# Patient Record
Sex: MALE | Race: WHITE | Employment: OTHER | URBAN - METROPOLITAN AREA
[De-identification: names, ages, dates, MRNs, and addresses within clinical notes are randomized per-mention and may not be internally consistent; named-entity substitution may affect disease eponyms.]

---

## 2017-03-17 ENCOUNTER — OFFICE VISIT (OUTPATIENT)
Dept: FAMILY MEDICINE | Facility: CLINIC | Age: 82
End: 2017-03-17
Payer: COMMERCIAL

## 2017-03-17 VITALS
RESPIRATION RATE: 18 BRPM | HEART RATE: 55 BPM | BODY MASS INDEX: 30.42 KG/M2 | TEMPERATURE: 97.9 F | DIASTOLIC BLOOD PRESSURE: 76 MMHG | SYSTOLIC BLOOD PRESSURE: 128 MMHG | WEIGHT: 212 LBS | OXYGEN SATURATION: 94 %

## 2017-03-17 DIAGNOSIS — N18.2 TYPE 2 DIABETES MELLITUS WITH STAGE 2 CHRONIC KIDNEY DISEASE, WITHOUT LONG-TERM CURRENT USE OF INSULIN (H): ICD-10-CM

## 2017-03-17 DIAGNOSIS — I10 HYPERTENSION GOAL BP (BLOOD PRESSURE) < 140/90: ICD-10-CM

## 2017-03-17 DIAGNOSIS — E78.5 HYPERLIPIDEMIA LDL GOAL <70: ICD-10-CM

## 2017-03-17 DIAGNOSIS — I48.20 CHRONIC ATRIAL FIBRILLATION (H): Primary | ICD-10-CM

## 2017-03-17 DIAGNOSIS — E11.22 TYPE 2 DIABETES MELLITUS WITH STAGE 2 CHRONIC KIDNEY DISEASE, WITHOUT LONG-TERM CURRENT USE OF INSULIN (H): ICD-10-CM

## 2017-03-17 DIAGNOSIS — J44.1 COPD EXACERBATION (H): ICD-10-CM

## 2017-03-17 DIAGNOSIS — I10 ESSENTIAL HYPERTENSION WITH GOAL BLOOD PRESSURE LESS THAN 140/90: ICD-10-CM

## 2017-03-17 LAB
ANION GAP SERPL CALCULATED.3IONS-SCNC: 10 MMOL/L (ref 3–14)
BUN SERPL-MCNC: 24 MG/DL (ref 7–30)
CALCIUM SERPL-MCNC: 8.3 MG/DL (ref 8.5–10.1)
CHLORIDE SERPL-SCNC: 102 MMOL/L (ref 94–109)
CHOLEST SERPL-MCNC: 146 MG/DL
CO2 SERPL-SCNC: 24 MMOL/L (ref 20–32)
CREAT SERPL-MCNC: 1.32 MG/DL (ref 0.66–1.25)
GFR SERPL CREATININE-BSD FRML MDRD: 52 ML/MIN/1.7M2
GLUCOSE SERPL-MCNC: 176 MG/DL (ref 70–99)
HBA1C MFR BLD: 7.4 % (ref 4.3–6)
HDLC SERPL-MCNC: 51 MG/DL
LDLC SERPL CALC-MCNC: 75 MG/DL
NONHDLC SERPL-MCNC: 95 MG/DL
POTASSIUM SERPL-SCNC: 4.6 MMOL/L (ref 3.4–5.3)
SODIUM SERPL-SCNC: 136 MMOL/L (ref 133–144)
T4 FREE SERPL-MCNC: 1.1 NG/DL (ref 0.76–1.46)
TRIGL SERPL-MCNC: 99 MG/DL
TSH SERPL DL<=0.005 MIU/L-ACNC: 4.57 MU/L (ref 0.4–4)

## 2017-03-17 PROCEDURE — 80048 BASIC METABOLIC PNL TOTAL CA: CPT | Performed by: PHYSICIAN ASSISTANT

## 2017-03-17 PROCEDURE — 84439 ASSAY OF FREE THYROXINE: CPT | Performed by: PHYSICIAN ASSISTANT

## 2017-03-17 PROCEDURE — 80061 LIPID PANEL: CPT | Performed by: PHYSICIAN ASSISTANT

## 2017-03-17 PROCEDURE — 99214 OFFICE O/P EST MOD 30 MIN: CPT | Performed by: PHYSICIAN ASSISTANT

## 2017-03-17 PROCEDURE — 84443 ASSAY THYROID STIM HORMONE: CPT | Performed by: PHYSICIAN ASSISTANT

## 2017-03-17 PROCEDURE — 83036 HEMOGLOBIN GLYCOSYLATED A1C: CPT | Performed by: PHYSICIAN ASSISTANT

## 2017-03-17 PROCEDURE — 36415 COLL VENOUS BLD VENIPUNCTURE: CPT | Performed by: PHYSICIAN ASSISTANT

## 2017-03-17 RX ORDER — GLIPIZIDE 2.5 MG/1
TABLET, EXTENDED RELEASE ORAL
Qty: 270 TABLET | Refills: 1 | Status: SHIPPED | OUTPATIENT
Start: 2017-03-17 | End: 2017-10-03

## 2017-03-17 RX ORDER — SIMVASTATIN 40 MG
40 TABLET ORAL AT BEDTIME
Qty: 90 TABLET | Refills: 1 | Status: SHIPPED | OUTPATIENT
Start: 2017-03-17 | End: 2017-10-03

## 2017-03-17 RX ORDER — AMLODIPINE BESYLATE 10 MG/1
10 TABLET ORAL DAILY
Qty: 90 TABLET | Refills: 1 | Status: SHIPPED | OUTPATIENT
Start: 2017-03-17 | End: 2017-10-03

## 2017-03-17 RX ORDER — LOSARTAN POTASSIUM 25 MG/1
25 TABLET ORAL DAILY
Qty: 90 TABLET | Refills: 1 | Status: SHIPPED | OUTPATIENT
Start: 2017-03-17 | End: 2017-09-25

## 2017-03-17 NOTE — MR AVS SNAPSHOT
"              After Visit Summary   3/17/2017    Camilo Young    MRN: [id number]           Patient Information     Date Of Birth          3/2/1934        Visit Information        Provider Department      3/17/2017 10:00 AM Aubrey Delgado PA-C [facility]        Today's Diagnoses     Chronic atrial fibrillation (H)    -  1    Essential hypertension with goal blood pressure less than 140/90        Hypertension goal BP (blood pressure) < 140/90        Hyperlipidemia LDL goal <70        Type 2 diabetes mellitus with stage 2 chronic kidney disease, without long-term current use of insulin (H)        COPD exacerbation (H)           Follow-ups after your visit        Who to contact     Normal or non-critical lab and imaging results will be communicated to you by [employer], letter or phone within 4 business days after the clinic has received the results. If you do not hear from us within 7 days, please contact the clinic through [employer] or phone. If you have a critical or abnormal lab result, we will notify you by phone as soon as possible.  Submit refill requests through [employer] or call your pharmacy and they will forward the refill request to us. Please allow 3 business days for your refill to be completed.          If you need to speak with a  for additional information , please call: [phone]             Additional Information About Your Visit        Text[employer]ker Information     [employer] lets you send messages to your doctor, view your test results, renew your prescriptions, schedule appointments and more. To sign up, go to [email or web address] . Click on \"Log in\" on the left side of the screen, which will take you to the Welcome page. Then click on \"Sign up Now\" on the right side of the page.     You will be asked to enter the access code listed below, as well as some personal information. Please follow the directions to create your username and password.     Your access code is: " [email or web address]  Expires: 6/15/2017 11:05 AM     Your access code will  in 90 days. If you need help or a new code, please call your [facility] clinic or [phone].        Care EveryWhere ID     This is your Care EveryWhere ID. This could be used by other organizations to access your [facility] medical records  [id number]        Your Vitals Were     Pulse Temperature Respirations Pulse Oximetry BMI (Body Mass Index)       55 97.9  F (36.6  C) (Tympanic) 18 94% 30.42 kg/m2        Blood Pressure from Last 3 Encounters:   17 128/76   16 122/72   16 115/78    Weight from Last 3 Encounters:   17 212 lb (96.2 kg)   16 219 lb (99.3 kg)   16 213 lb (96.6 kg)              We Performed the Following     Basic metabolic panel  (Ca, Cl, CO2, Creat, Gluc, K, Na, BUN)     Hemoglobin A1c     Lipid panel reflex to direct LDL     TSH with free T4 reflex          Today's Medication Changes          These changes are accurate as of: 3/17/17 11:05 AM.  If you have any questions, ask your nurse or doctor.               These medicines have changed or have updated prescriptions.        Dose/Directions    aspirin 81 MG tablet   This may have changed:  Another medication with the same name was removed. Continue taking this medication, and follow the directions you see here.   Changed by:  Aubrey Delgado PA-C        Dose:  81 mg   Take 81 mg by mouth daily   Refills:  0         Stop taking these medicines if you haven't already. Please contact your care team if you have questions.     albuterol 108 (90 BASE) MCG/ACT Inhaler   Commonly known as:  VENTOLIN HFA   Stopped by:  Aubrey Delgado PA-C           azithromycin 250 MG tablet   Commonly known as:  ZITHROMAX   Stopped by:  Aubrey Delgado PA-C           beclomethasone 80 MCG/ACT Inhaler   Commonly known as:  QVAR   Stopped by:  Aubrey Delgado PA-C           tiotropium 2.5 MCG/ACT inhalation aerosol   Commonly known as:  SPIRIVA  RESPIMAT   Stopped by:  Aubrey Delgado PA-C                Where to get your medicines      These medications were sent to [id number] - [place], [place] - [address]  [address] [place] [address]     Phone:  [phone]     amLODIPine 10 MG tablet    glipiZIDE 2.5 MG 24 hr tablet    losartan 25 MG tablet    metFORMIN 1000 MG tablet    simvastatin 40 MG tablet                Primary Care Provider Office Phone # Fax #    Aubrey Delgado PA-C [phone]       [facility] [place] [address]  [place] [address]        Thank you!     Thank you for choosing [facility]  for your care. Our goal is always to provide you with excellent care. Hearing back from our patients is one way we can continue to improve our services. Please take a few minutes to complete the written survey that you may receive in the mail after your visit with us. Thank you!             Your Updated Medication List - Protect others around you: Learn how to safely use, store and throw away your medicines at www.disposemymeds.org.          This list is accurate as of: 3/17/17 11:05 AM.  Always use your most recent med list.                   Brand Name Dispense Instructions for use    ADVAIR DISKUS IN          amLODIPine 10 MG tablet    NORVASC    90 tablet    Take 1 tablet (10 mg) by mouth daily       apixaban ANTICOAGULANT 2.5 MG tablet    ELIQUIS    180 tablet    Take 1 tablet (2.5 mg) by mouth 2 times daily       aspirin 81 MG tablet      Take 81 mg by mouth daily       blood glucose monitoring lancets     1 Box    1 each 3 times daily       blood glucose monitoring test strip    ACCU-CHEK SMARTVIEW    3 Month    1 strip by In Vitro route 3 times daily Or as recommended by provider       glipiZIDE 2.5 MG 24 hr tablet    GLUCOTROL XL    270 tablet    Take 2 tabs in the morning prior to breakfast and 1 prior to supper       losartan 25 MG tablet    COZAAR    90 tablet    Take 1 tablet  (25 mg) by mouth daily       metFORMIN 1000 MG tablet    GLUCOPHAGE    180 tablet    Take 1 tablet (1,000 mg) by mouth 2 times daily (with meals)       simvastatin 40 MG tablet    ZOCOR    90 tablet    Take 1 tablet (40 mg) by mouth At Bedtime

## 2017-03-17 NOTE — LETTER
[facility]  [address] [place] [address]  [phone]        March 27, 2017      Camilo Young  [address] [place] [address]        Dear Camilo,      Your thyroid function came back looking ok. Your tsh remains a little elevated and so I want to continue to follow this at future appts. Your kidney function has fallen off a little. This stresses the importance of good blood sugar control and staying well hydrated. Your cholesterol numbers looked fantastic. Remember to see me back in clinic in 3 months, sooner if necessary.     Results for orders placed or performed in visit on 03/17/17   Hemoglobin A1c   Result Value Ref Range    Hemoglobin A1C 7.4 (H) 4.3 - 6.0 %   Basic metabolic panel  (Ca, Cl, CO2, Creat, Gluc, K, Na, BUN)   Result Value Ref Range    Sodium 136 133 - 144 mmol/L    Potassium 4.6 3.4 - 5.3 mmol/L    Chloride 102 94 - 109 mmol/L    Carbon Dioxide 24 20 - 32 mmol/L    Anion Gap 10 3 - 14 mmol/L    Glucose 176 (H) 70 - 99 mg/dL    Urea Nitrogen 24 7 - 30 mg/dL    Creatinine 1.32 (H) 0.66 - 1.25 mg/dL    GFR Estimate 52 (L) >60 mL/min/1.7m2    GFR Estimate If Black 63 >60 mL/min/1.7m2    Calcium 8.3 (L) 8.5 - 10.1 mg/dL   Lipid panel reflex to direct LDL   Result Value Ref Range    Cholesterol 146 <200 mg/dL    Triglycerides 99 <150 mg/dL    HDL Cholesterol 51 >39 mg/dL    LDL Cholesterol Calculated 75 <100 mg/dL    Non HDL Cholesterol 95 <130 mg/dL   TSH with free T4 reflex   Result Value Ref Range    TSH 4.57 (H) 0.40 - 4.00 mU/L   T4 free   Result Value Ref Range    T4 Free 1.10 0.76 - 1.46 ng/dL     If you have any questions or concerns, please call myself or my nurse at [phone].    Sincerely,    Aubrey Delgado PA-C/deven

## 2017-03-17 NOTE — PROGRESS NOTES
SUBJECTIVE:                                                    Camilo Young is a 83 year old male who presents to clinic today for the following health issues:      Diabetes Follow-up    Patient is checking blood sugars: once daily.  Results are as follows:             Diabetic concerns: None     Symptoms of hypoglycemia (low blood sugar): none     Paresthesias (numbness or burning in feet) or sores: No     Date of last diabetic eye exam: 2016       Amount of exercise or physical activity: None    Problems taking medications regularly: No    Medication side effects: none    Diet: regular (no restrictions)    Acute Illness   Acute illness concerns: cough follow up was seen in the ER yesterday diagnosed with pneumonia   Onset: 1 week    Fever: no    Chills/Sweats: no    Headache (location?): no    Sinus Pressure:YES    Conjunctivitis:  no    Ear Pain: no    Rhinorrhea: YES    Congestion: YES    Sore Throat: no     Cough: YES-productive of yellow sputum    Wheeze: YES    Decreased Appetite: YES    Nausea: YES    Vomiting: no    Diarrhea:  no    Dysuria/Freq.: no    Fatigue/Achiness: YES    Sick/Strep Exposure: no     Therapies Tried and outcome: antibiotics and fluids given at ER yesterday    Doing better. More energy. Breathing better. No fever.   Denies palpitations or chest pain. No edema      Problem list and histories reviewed & adjusted, as indicated.  Additional history: as documented        Reviewed and updated as needed this visit by clinical staff  Tobacco  Allergies  Meds  Problems       Reviewed and updated as needed this visit by Provider         All other systems negative except as outline above  OBJECTIVE:  Eye exam - right eye normal lid, conjunctiva, cornea, pupil and fundus, left eye normal lid, conjunctiva, cornea, pupil and fundus.  ENT exam reveals - bilateral TM normal without fluid or infection, neck without nodes, throat normal without erythema or exudate, sinuses nontender, post  nasal drip noted, nasal mucosa congested and nasal mucosa pale and congested.  CHEST:no tachypnea, retractions or cyanosis, air entry reduced both upper lobes, bilateral basilar rales heard and Heart exam detail:irregularly irregular rhythm, chest is clear without rales or wheezing, no pedal edema, no JVD, no hepatosplenomegaly.  No edema    Camilo was seen today for cough and diabetes.    Diagnoses and all orders for this visit:    Chronic atrial fibrillation (H)  -     TSH with free T4 reflex    Essential hypertension with goal blood pressure less than 140/90  -     amLODIPine (NORVASC) 10 MG tablet; Take 1 tablet (10 mg) by mouth daily  -     Basic metabolic panel  (Ca, Cl, CO2, Creat, Gluc, K, Na, BUN)    Hypertension goal BP (blood pressure) < 140/90  -     losartan (COZAAR) 25 MG tablet; Take 1 tablet (25 mg) by mouth daily    Hyperlipidemia LDL goal <70  -     simvastatin (ZOCOR) 40 MG tablet; Take 1 tablet (40 mg) by mouth At Bedtime  -     Lipid panel reflex to direct LDL    Type 2 diabetes mellitus with stage 2 chronic kidney disease, without long-term current use of insulin (H)  -     glipiZIDE (GLUCOTROL XL) 2.5 MG 24 hr tablet; Take 2 tabs in the morning prior to breakfast and 1 prior to supper  -     metFORMIN (GLUCOPHAGE) 1000 MG tablet; Take 1 tablet (1,000 mg) by mouth 2 times daily (with meals)  -     Hemoglobin A1c    COPD exacerbation (H)      work on lifestyle modification  Recheck in 3 mos.

## 2017-03-20 ENCOUNTER — TELEPHONE (OUTPATIENT)
Dept: FAMILY MEDICINE | Facility: CLINIC | Age: 82
End: 2017-03-20

## 2017-03-20 NOTE — TELEPHONE ENCOUNTER
[facility] states the patient has been on and off sick and she has had to miss work so she is wondering if she could get her FMLA paperwork filled out without having to make an appointment. Please call.    Thank you.

## 2017-03-30 ENCOUNTER — TELEPHONE (OUTPATIENT)
Dept: FAMILY MEDICINE | Facility: CLINIC | Age: 82
End: 2017-03-30

## 2017-03-30 NOTE — TELEPHONE ENCOUNTER
Daughter of patient Camilo would like [facility] forms complete for the occasions that she needs to take off work to care for her Dad. He was last seen by Aubrey for pneumonia on 03/17/17. Forms to Aubrey's in box.

## 2017-04-06 DIAGNOSIS — J41.8 MIXED SIMPLE AND MUCOPURULENT CHRONIC BRONCHITIS (H): Primary | ICD-10-CM

## 2017-04-06 NOTE — TELEPHONE ENCOUNTER
Patient is requesting a hard copy of Symbicort 160/4.5 mcg qty 360 ds be printed so that he may submit this to [employer] in [place]. He would like to use this instead of Fluticasone-Salmeterol (ADVAIR DISKUS ). Patient will pick this up when ready.

## 2017-04-07 RX ORDER — BUDESONIDE AND FORMOTEROL FUMARATE DIHYDRATE 160; 4.5 UG/1; UG/1
2 AEROSOL RESPIRATORY (INHALATION) 2 TIMES DAILY
Qty: 1 INHALER | Refills: 11 | Status: SHIPPED | OUTPATIENT
Start: 2017-04-07 | End: 2017-04-10

## 2017-04-10 RX ORDER — BUDESONIDE AND FORMOTEROL FUMARATE DIHYDRATE 160; 4.5 UG/1; UG/1
2 AEROSOL RESPIRATORY (INHALATION) 2 TIMES DAILY
Qty: 3 INHALER | Refills: 3 | Status: SHIPPED | OUTPATIENT
Start: 2017-04-10 | End: 2017-04-10

## 2017-04-10 RX ORDER — BUDESONIDE AND FORMOTEROL FUMARATE DIHYDRATE 160; 4.5 UG/1; UG/1
2 AEROSOL RESPIRATORY (INHALATION) 2 TIMES DAILY
Qty: 3 INHALER | Refills: 3 | Status: SHIPPED | OUTPATIENT
Start: 2017-04-10 | End: 2017-10-03

## 2017-05-03 NOTE — TELEPHONE ENCOUNTER
Per HIM, adult to adult FMLA forms cannot be scanned into chart. Hippa risk. Forms were faxed to daughters employer on 03/31/17, they will have a copy if ever needed. Copy shredded from [facility].

## 2017-09-05 ENCOUNTER — OFFICE VISIT (OUTPATIENT)
Dept: ORTHOPEDICS | Facility: CLINIC | Age: 82
End: 2017-09-05
Payer: COMMERCIAL

## 2017-09-05 ENCOUNTER — RADIANT APPOINTMENT (OUTPATIENT)
Dept: GENERAL RADIOLOGY | Facility: CLINIC | Age: 82
End: 2017-09-05
Attending: ORTHOPAEDIC SURGERY
Payer: COMMERCIAL

## 2017-09-05 VITALS — HEIGHT: 69 IN | RESPIRATION RATE: 14 BRPM | WEIGHT: 221.2 LBS | BODY MASS INDEX: 32.76 KG/M2

## 2017-09-05 DIAGNOSIS — M17.11 PRIMARY OSTEOARTHRITIS OF RIGHT KNEE: Primary | ICD-10-CM

## 2017-09-05 DIAGNOSIS — M17.11 PRIMARY OSTEOARTHRITIS OF RIGHT KNEE: ICD-10-CM

## 2017-09-05 DIAGNOSIS — M23.41 LOOSE, BODY, JOINT, KNEE, RIGHT: ICD-10-CM

## 2017-09-05 PROCEDURE — 99213 OFFICE O/P EST LOW 20 MIN: CPT | Mod: 25 | Performed by: ORTHOPAEDIC SURGERY

## 2017-09-05 PROCEDURE — 20610 DRAIN/INJ JOINT/BURSA W/O US: CPT | Mod: RT | Performed by: ORTHOPAEDIC SURGERY

## 2017-09-05 PROCEDURE — 73562 X-RAY EXAM OF KNEE 3: CPT | Mod: RT

## 2017-09-05 ASSESSMENT — PAIN SCALES - GENERAL: PAINLEVEL: MODERATE PAIN (5)

## 2017-09-05 NOTE — PROGRESS NOTES
HISTORY OF PRESENT ILLNESS    Camilo Young is a 83 year old male who is seen as self referral for evaluation of  right knee pain. No known injury. 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 He was given a steroid injection which helped for a couple of years.     He was seen at [facility] after the right knee gave out on 9/2/17 where x-rays were taken which showed loose bodies without significant osteoarthritis, but these were non-standing x-rays. He has been having catching of the knee and has been using a cane.     Present symptoms: +catching/popping, +giving way, pain weight bearing. The symptoms occur occasionally.     Treatments tried to this point: none.     Orthopedic PMH: Decompression, fusion lumbar posterior one level, combined. Previous carpal tunnel steroid injection in the past which is doing well.     Other PMH:  has a past medical history of Diabetes mellitus type 2, controlled (H); Emphysema; Hyperlipidemia LDL goal <100 (5/16/2012); and Hypertension (5/16/2012).    Surgical:  has a past surgical history that includes Decompression, fusion lumbar posterior one level, combined.    Family Hx:  family history includes C.A.D. in his sister; CANCER in his sister; Cancer - colorectal in his mother.    Social Hx:  reports that he quit smoking about 26 years ago. He has never used smokeless tobacco. He reports that he does not drink alcohol or use illicit drugs.    REVIEW OF SYSTEMS:    CONSTITUTIONAL:  NEGATIVE for fever, chills, change in weight  INTEGUMENTARY/SKIN:  NEGATIVE for worrisome rashes, moles or lesions  EYES:  NEGATIVE for vision changes or irritation  ENT/MOUTH:  NEGATIVE for ear, mouth and throat problems  RESP:  NEGATIVE for significant cough or SOB  BREAST:  NEGATIVE for masses, tenderness or discharge  CV:  NEGATIVE for chest pain, palpitations or peripheral edema  GI:   "NEGATIVE for nausea, abdominal pain, heartburn, or change in bowel habits  :  Negative   MUSCULOSKELETAL:  See HPI above  NEURO:  NEGATIVE for weakness, dizziness or paresthesias  ENDOCRINE:  NEGATIVE for temperature intolerance, skin/hair changes  HEME/ALLERGY/IMMUNE:  NEGATIVE for bleeding problems  PSYCHIATRIC:  NEGATIVE for changes in mood or affect    PHYSICAL EXAM:  Resp 14  Ht 1.752 m (5' 8.98\")  Wt 100.3 kg (221 lb 3.2 oz)  BMI 32.69 kg/m2  GENERAL APPEARANCE: healthy, alert and no distress   SKIN: no suspicious lesions or rashes  NEURO: Normal strength and tone, mentation intact and speech normal  VASCULAR: good pulses, and cappillary refill   LYMPH: no lymphadenopathy   PSYCH:  mentation appears normal and affect normal/bright    KNEE EXAM:   Gait: walks with antalgic gait favoring right side  He holds the knee flexed.    ROM: 15*-110 degrees   Effusion: moderate  Tender: medial joint line  Ligaments: Lachman's Stable, Anterior and posterior drawer stable, stable to varus and valgus stress.   Patellofemoral joint: no crepitations in the patellofemoral joint.      X-RAY: Obtained today of the RIGHT KNEE: 3-views, reviewed in the office with the patient by myself today and show mild medial joint space narrowing and moderately severe degenerative changes in the patellofemoral joint. There maybe a loose body in the posterior aspect of the knee.     Impression:  1. Moderate OA of the right knee and loose body  2. Known degenerative meniscal tear     Plan:    I discussed the findings and diagnosis with the patient. We talked about the treatment options: corticosteroid injections vs surgical intervention(s). We talked about surgery, possible loose body removal. He would like to avoid surgery. All questions were answered. The patient understands and has elected to proceed with repeat corticosteroid injection.     With the patient's consent, right knee(s) injected intra-articularly with 80mg of Depomedrol and " 4cc of local anesthetic after sterile prep.    Return to clinic DAYANA.    HIRO Alegria MD  Dept. Orthopedic Surgery  [facility]     This document serves as a record of the services and decisions personally performed and made by Dr. HIRO Alegria MD. It was created on his behalf by Miryam Martinez, a trained medical scribe. The creation of this record is based on the provider's personal observations and the statements of the patient. This document has been checked and approved by the attending provider.   Miryam Martinez September 5, 2017 9:57 AM

## 2017-09-05 NOTE — LETTER
9/5/2017         RE: Camilo Young  [address] [place] [address]        Dear Colleague,    Thank you for referring your patient, Camilo Young, to the [facility]. Please see a copy of my visit note below.    HISTORY OF PRESENT ILLNESS    Camilo Young is a 83 year old male who is seen as self referral for evaluation of  right knee pain. No known injury. 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 He was given a steroid injection which helped for a couple of years.     He was seen at [facility] after the right knee gave out on 9/2/17 where x-rays were taken which showed loose bodies without significant osteoarthritis, but these were non-standing x-rays. He has been having catching of the knee and has been using a cane.     Present symptoms: +catching/popping, +giving way, pain weight bearing. The symptoms occur occasionally.     Treatments tried to this point: none.     Orthopedic PMH: Decompression, fusion lumbar posterior one level, combined. Previous carpal tunnel steroid injection in the past which is doing well.     Other PMH:  has a past medical history of Diabetes mellitus type 2, controlled (H); Emphysema; Hyperlipidemia LDL goal <100 (5/16/2012); and Hypertension (5/16/2012).    Surgical:  has a past surgical history that includes Decompression, fusion lumbar posterior one level, combined.    Family Hx:  family history includes C.A.D. in his sister; CANCER in his sister; Cancer - colorectal in his mother.    Social Hx:  reports that he quit smoking about 26 years ago. He has never used smokeless tobacco. He reports that he does not drink alcohol or use illicit drugs.    REVIEW OF SYSTEMS:    CONSTITUTIONAL:  NEGATIVE for fever, chills, change in weight  INTEGUMENTARY/SKIN:  NEGATIVE for worrisome rashes, moles or lesions  EYES:  NEGATIVE for vision changes or  "irritation  ENT/MOUTH:  NEGATIVE for ear, mouth and throat problems  RESP:  NEGATIVE for significant cough or SOB  BREAST:  NEGATIVE for masses, tenderness or discharge  CV:  NEGATIVE for chest pain, palpitations or peripheral edema  GI:  NEGATIVE for nausea, abdominal pain, heartburn, or change in bowel habits  :  Negative   MUSCULOSKELETAL:  See HPI above  NEURO:  NEGATIVE for weakness, dizziness or paresthesias  ENDOCRINE:  NEGATIVE for temperature intolerance, skin/hair changes  HEME/ALLERGY/IMMUNE:  NEGATIVE for bleeding problems  PSYCHIATRIC:  NEGATIVE for changes in mood or affect    PHYSICAL EXAM:  Resp 14  Ht 1.752 m (5' 8.98\")  Wt 100.3 kg (221 lb 3.2 oz)  BMI 32.69 kg/m2  GENERAL APPEARANCE: healthy, alert and no distress   SKIN: no suspicious lesions or rashes  NEURO: Normal strength and tone, mentation intact and speech normal  VASCULAR: good pulses, and cappillary refill   LYMPH: no lymphadenopathy   PSYCH:  mentation appears normal and affect normal/bright    KNEE EXAM:   Gait: walks with antalgic gait favoring right side  He holds the knee flexed.    ROM: 15*-110 degrees   Effusion: moderate  Tender: medial joint line  Ligaments: Lachman's Stable, Anterior and posterior drawer stable, stable to varus and valgus stress.   Patellofemoral joint: no crepitations in the patellofemoral joint.      X-RAY: Obtained today of the RIGHT KNEE: 3-views, reviewed in the office with the patient by myself today and show mild medial joint space narrowing and moderately severe degenerative changes in the patellofemoral joint. There maybe a loose body in the posterior aspect of the knee.     Impression:  1. Moderate OA of the right knee and loose body  2. Known degenerative meniscal tear     Plan:    I discussed the findings and diagnosis with the patient. We talked about the treatment options: corticosteroid injections vs surgical intervention(s). We talked about surgery, possible loose body removal. He would " like to avoid surgery. All questions were answered. The patient understands and has elected to proceed with repeat corticosteroid injection.     With the patient's consent, right knee(s) injected intra-articularly with 80mg of Depomedrol and 4cc of local anesthetic after sterile prep.    Return to clinic PRN.    HIRO Alegria MD  Dept. Orthopedic Surgery  [facility]     This document serves as a record of the services and decisions personally performed and made by Dr. HIRO Alegria MD. It was created on his behalf by Miryam Martinez, a trained medical scribe. The creation of this record is based on the provider's personal observations and the statements of the patient. This document has been checked and approved by the attending provider.   Miryam Martinez September 5, 2017 9:57 AM    Again, thank you for allowing me to participate in the care of your patient.        Sincerely,        Odilon Alegria MD

## 2017-09-05 NOTE — NURSING NOTE
"A steroid and or Hylan G - F 20 injection was performed on 9/5/2017 at 9:59 AM. Risks,benefits and complications of the injection were discussed with the patient and the patient has elected to proceed after verbal consent. Using sterile technique, the area was prepped with betadine . A 22 Gauge 1.5\" was used to inject the medication(s) listed below.This was well tolerated. No apparent complications. . Did also discuss that if diabetic, recommend close monitoring of blood sugars over the next week as cortisone injections can temporarily elevate blood sugar.    The following medication(s) was given:     MEDICATION: Depo Medrol 80mg per mL  ROUTE: intraarticular  SITE:Right Knee   : [employer] (Depo-Medrol)  DOSE: 1 ml  LOT #: [id number]  EXPIRATION DATE:  11/2019    MEDICATION: Lidocaine HCL  1% per mL  : [employer] (Marcaine,Lidoncaine)  DOSE: 4 ml  LOT #: [id number]  EXPIRATION DATE:  1 feb 2019    Simeon TEJADA    "

## 2017-09-05 NOTE — MR AVS SNAPSHOT
"              After Visit Summary   2017    Camilo Young    MRN: [id number]           Patient Information     Date Of Birth          3/2/1934        Visit Information        Provider Department      2017 9:15 AM Odilon Alegria MD [facility]        Today's Diagnoses     Primary osteoarthritis of right knee    -  1    Loose, body, joint, knee, right           Follow-ups after your visit        Who to contact     If you have questions or need follow up information about today's clinic visit or your schedule please contact [facility] directly at [phone].  Normal or non-critical lab and imaging results will be communicated to you by Wyliohart, letter or phone within 4 business days after the clinic has received the results. If you do not hear from us within 7 days, please contact the clinic through Wyliohart or phone. If you have a critical or abnormal lab result, we will notify you by phone as soon as possible.  Submit refill requests through BusyFlow or call your pharmacy and they will forward the refill request to us. Please allow 3 business days for your refill to be completed.          Additional Information About Your Visit        MyChart Information     BusyFlow lets you send messages to your doctor, view your test results, renew your prescriptions, schedule appointments and more. To sign up, go to [email or web address] . Click on \"Log in\" on the left side of the screen, which will take you to the Welcome page. Then click on \"Sign up Now\" on the right side of the page.     You will be asked to enter the access code listed below, as well as some personal information. Please follow the directions to create your username and password.     Your access code is: [email or web address]  Expires: 2017  9:14 AM     Your access code will  in 90 days. If you need help or a new code, please call your [facility] or [phone].        Care EveryWhere ID     This is your Care " "EveryWhere ID. This could be used by other organizations to access your [place] medical records  [id number]        Your Vitals Were     Respirations Height BMI (Body Mass Index)             14 1.752 m (5' 8.98\") 32.69 kg/m2          Blood Pressure from Last 3 Encounters:   03/17/17 128/76   09/20/16 122/72   05/12/16 115/78    Weight from Last 3 Encounters:   09/05/17 100.3 kg (221 lb 3.2 oz)   03/17/17 96.2 kg (212 lb)   09/20/16 99.3 kg (219 lb)              We Performed the Following     DRAIN/INJECT LARGE JOINT/BURSA     METHYLPREDNISOLONE 80 MG INJ        Primary Care Provider Office Phone # Fax #    Aubrey Delgado PA-C [phone]       [address] [place] [address]        Equal Access to Services     [facility]: Hadii aad ku hadasho Soomaali, waaxda luqadaha, qaybta kaalmada adeegyada, waxay idiin hayaan linh kharash carter . So [facility] [phone].    ATENCIÓN: Si habla español, tiene a wright disposición servicios gratuitos de asistencia lingüística. López al [phone].    We comply with applicable federal civil rights laws and [place] laws. We do not discriminate on the basis of race, color, national origin, age, disability sex, sexual orientation or gender identity.            Thank you!     Thank you for choosing [facility]  for your care. Our goal is always to provide you with excellent care. Hearing back from our patients is one way we can continue to improve our services. Please take a few minutes to complete the written survey that you may receive in the mail after your visit with us. Thank you!             Your Updated Medication List - Protect others around you: Learn how to safely use, store and throw away your medicines at www.disposemymeds.org.          This list is accurate as of: 9/5/17 11:01 PM.  Always use your most recent med list.                   Brand Name Dispense Instructions for use Diagnosis    amLODIPine 10 MG tablet    NORVASC    90 " tablet    Take 1 tablet (10 mg) by mouth daily    Essential hypertension with goal blood pressure less than 140/90       apixaban ANTICOAGULANT 2.5 MG tablet    ELIQUIS    180 tablet    Take 1 tablet (2.5 mg) by mouth 2 times daily        aspirin 81 MG tablet      Take 81 mg by mouth daily        blood glucose monitoring lancets     1 Box    1 each 3 times daily    Type 2 diabetes, HbA1c goal < 7% (H)       blood glucose monitoring test strip    ACCU-CHEK SMARTVIEW    3 Month    1 strip by In Vitro route 3 times daily Or as recommended by provider    Diabetes mellitus type 2, uncomplicated (H)       budesonide-formoterol 160-4.5 MCG/ACT Inhaler    SYMBICORT    3 Inhaler    Inhale 2 puffs into the lungs 2 times daily    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)       glipiZIDE 2.5 MG 24 hr tablet    GLUCOTROL XL    270 tablet    Take 2 tabs in the morning prior to breakfast and 1 prior to supper    Type 2 diabetes mellitus with stage 2 chronic kidney disease, without long-term current use of insulin (H)       losartan 25 MG tablet    COZAAR    90 tablet    Take 1 tablet (25 mg) by mouth daily    Hypertension goal BP (blood pressure) < 140/90       metFORMIN 1000 MG tablet    GLUCOPHAGE    180 tablet    Take 1 tablet (1,000 mg) by mouth 2 times daily (with meals)    Type 2 diabetes mellitus with stage 2 chronic kidney disease, without long-term current use of insulin (H)       simvastatin 40 MG tablet    ZOCOR    90 tablet    Take 1 tablet (40 mg) by mouth At Bedtime    Hyperlipidemia LDL goal <70

## 2017-09-25 DIAGNOSIS — I10 HYPERTENSION GOAL BP (BLOOD PRESSURE) < 140/90: ICD-10-CM

## 2017-09-25 NOTE — TELEPHONE ENCOUNTER
Losartan      Last Written Prescription Date: 06/19/17  Last Fill Quantity: 90, # refills: 1  Last Office Visit with [facility] or [facility] prescribing provider: 03/17/17       Potassium   Date Value Ref Range Status   03/17/2017 4.6 3.4 - 5.3 mmol/L Final     Creatinine   Date Value Ref Range Status   03/17/2017 1.32 (H) 0.66 - 1.25 mg/dL Final     BP Readings from Last 3 Encounters:   03/17/17 128/76   09/20/16 122/72   05/12/16 115/78

## 2017-09-25 NOTE — LETTER
September 26, 2017      Camilo Young  [address]  [place] [address]        Dear Camilo Page     Your medication has been approved for losartan.    However, you are due for a follow up appointment for further refills. Please schedule this visit at your earliest convenience.    [facility]

## 2017-09-25 NOTE — TELEPHONE ENCOUNTER
Routing refill request to provider for review/approval because:  Labs out of range:  creat  Patient needs to be seen because:  Was to follow up in June/July.  Radha Mendez RN

## 2017-09-26 RX ORDER — LOSARTAN POTASSIUM 25 MG/1
25 TABLET ORAL DAILY
Qty: 30 TABLET | Refills: 0 | Status: SHIPPED | OUTPATIENT
Start: 2017-09-26 | End: 2017-10-03

## 2017-10-03 ENCOUNTER — OFFICE VISIT (OUTPATIENT)
Dept: FAMILY MEDICINE | Facility: CLINIC | Age: 82
End: 2017-10-03
Payer: COMMERCIAL

## 2017-10-03 VITALS
BODY MASS INDEX: 31.18 KG/M2 | WEIGHT: 211 LBS | OXYGEN SATURATION: 96 % | DIASTOLIC BLOOD PRESSURE: 73 MMHG | TEMPERATURE: 98 F | SYSTOLIC BLOOD PRESSURE: 136 MMHG | HEART RATE: 87 BPM

## 2017-10-03 DIAGNOSIS — E78.5 HYPERLIPIDEMIA LDL GOAL <70: ICD-10-CM

## 2017-10-03 DIAGNOSIS — E11.22 TYPE 2 DIABETES MELLITUS WITH STAGE 3 CHRONIC KIDNEY DISEASE, WITHOUT LONG-TERM CURRENT USE OF INSULIN (H): ICD-10-CM

## 2017-10-03 DIAGNOSIS — N18.30 TYPE 2 DIABETES MELLITUS WITH STAGE 3 CHRONIC KIDNEY DISEASE, WITHOUT LONG-TERM CURRENT USE OF INSULIN (H): ICD-10-CM

## 2017-10-03 DIAGNOSIS — R79.89 ELEVATED TSH: ICD-10-CM

## 2017-10-03 DIAGNOSIS — I48.20 CHRONIC ATRIAL FIBRILLATION (H): ICD-10-CM

## 2017-10-03 DIAGNOSIS — I10 HYPERTENSION GOAL BP (BLOOD PRESSURE) < 140/90: ICD-10-CM

## 2017-10-03 DIAGNOSIS — J41.8 MIXED SIMPLE AND MUCOPURULENT CHRONIC BRONCHITIS (H): ICD-10-CM

## 2017-10-03 DIAGNOSIS — I10 ESSENTIAL HYPERTENSION WITH GOAL BLOOD PRESSURE LESS THAN 140/90: Primary | ICD-10-CM

## 2017-10-03 PROBLEM — N18.2 TYPE 2 DIABETES MELLITUS WITH STAGE 2 CHRONIC KIDNEY DISEASE, WITHOUT LONG-TERM CURRENT USE OF INSULIN (H): Status: RESOLVED | Noted: 2017-03-17 | Resolved: 2017-10-03

## 2017-10-03 LAB
CREAT UR-MCNC: 159 MG/DL
HBA1C MFR BLD: 8.1 % (ref 4.3–6)
MICROALBUMIN UR-MCNC: 107 MG/L
MICROALBUMIN/CREAT UR: 67.3 MG/G CR (ref 0–17)
T4 FREE SERPL-MCNC: 0.94 NG/DL (ref 0.76–1.46)
TSH SERPL DL<=0.005 MIU/L-ACNC: 4.9 MU/L (ref 0.4–4)

## 2017-10-03 PROCEDURE — 99207 C FOOT EXAM  NO CHARGE: CPT | Performed by: PHYSICIAN ASSISTANT

## 2017-10-03 PROCEDURE — 99214 OFFICE O/P EST MOD 30 MIN: CPT | Performed by: PHYSICIAN ASSISTANT

## 2017-10-03 PROCEDURE — 36415 COLL VENOUS BLD VENIPUNCTURE: CPT | Performed by: PHYSICIAN ASSISTANT

## 2017-10-03 PROCEDURE — 84439 ASSAY OF FREE THYROXINE: CPT | Performed by: PHYSICIAN ASSISTANT

## 2017-10-03 PROCEDURE — 84443 ASSAY THYROID STIM HORMONE: CPT | Performed by: PHYSICIAN ASSISTANT

## 2017-10-03 PROCEDURE — 86376 MICROSOMAL ANTIBODY EACH: CPT | Performed by: PHYSICIAN ASSISTANT

## 2017-10-03 PROCEDURE — 82043 UR ALBUMIN QUANTITATIVE: CPT | Performed by: PHYSICIAN ASSISTANT

## 2017-10-03 PROCEDURE — 83036 HEMOGLOBIN GLYCOSYLATED A1C: CPT | Performed by: PHYSICIAN ASSISTANT

## 2017-10-03 RX ORDER — LOSARTAN POTASSIUM 25 MG/1
25 TABLET ORAL DAILY
Qty: 30 TABLET | Refills: 0 | Status: SHIPPED | OUTPATIENT
Start: 2017-10-03 | End: 2017-10-03

## 2017-10-03 RX ORDER — BUDESONIDE AND FORMOTEROL FUMARATE DIHYDRATE 160; 4.5 UG/1; UG/1
2 AEROSOL RESPIRATORY (INHALATION) 2 TIMES DAILY
Qty: 3 INHALER | Refills: 3 | Status: SHIPPED | OUTPATIENT
Start: 2017-10-03 | End: 2018-02-01

## 2017-10-03 RX ORDER — GLIPIZIDE 10 MG/1
10 TABLET, FILM COATED, EXTENDED RELEASE ORAL DAILY
Qty: 90 TABLET | Refills: 0 | Status: SHIPPED | OUTPATIENT
Start: 2017-10-03 | End: 2017-12-30

## 2017-10-03 RX ORDER — AMLODIPINE BESYLATE 10 MG/1
10 TABLET ORAL DAILY
Qty: 90 TABLET | Refills: 1 | Status: SHIPPED | OUTPATIENT
Start: 2017-10-03 | End: 2018-02-01

## 2017-10-03 RX ORDER — GLIPIZIDE 2.5 MG/1
TABLET, EXTENDED RELEASE ORAL
Qty: 270 TABLET | Refills: 1 | Status: SHIPPED | OUTPATIENT
Start: 2017-10-03 | End: 2017-10-03

## 2017-10-03 RX ORDER — LOSARTAN POTASSIUM 25 MG/1
25 TABLET ORAL DAILY
Qty: 90 TABLET | Refills: 1 | Status: SHIPPED | OUTPATIENT
Start: 2017-10-03 | End: 2018-02-01

## 2017-10-03 RX ORDER — SIMVASTATIN 40 MG
40 TABLET ORAL AT BEDTIME
Qty: 90 TABLET | Refills: 1 | Status: SHIPPED | OUTPATIENT
Start: 2017-10-03 | End: 2018-02-01

## 2017-10-03 NOTE — NURSING NOTE
"Chief Complaint   Patient presents with     Recheck Medication       Initial /73  Pulse 87  Temp 98  F (36.7  C) (Oral)  Wt 211 lb (95.7 kg)  SpO2 96%  BMI 31.18 kg/m2 Estimated body mass index is 31.18 kg/(m^2) as calculated from the following:    Height as of 9/5/17: 5' 8.98\" (1.752 m).    Weight as of this encounter: 211 lb (95.7 kg).  Medication Reconciliation: complete   Viki Mahoney CMA      "

## 2017-10-03 NOTE — LETTER
October 17, 2017      Camilo Young  [address] [place] [address]        Dear Camilo,    We are writing to inform you of your test results.      Your thyroid function came back looking fine. Make the changes in your diabetes treatment plan that we discussed at your visit and see me in 2-3 months for a recheck.    Resulted Orders   Hemoglobin A1c   Result Value Ref Range    Hemoglobin A1C 8.1 (H) 4.3 - 6.0 %   Albumin Random Urine Quantitative with Creat Ratio   Result Value Ref Range    Creatinine Urine 159 mg/dL    Albumin Urine mg/L 107 mg/L    Albumin Urine mg/g Cr 67.30 (H) 0 - 17 mg/g Cr   TSH with free T4 reflex   Result Value Ref Range    TSH 4.90 (H) 0.40 - 4.00 mU/L   Thyroid peroxidase antibody   Result Value Ref Range    Thyroid Peroxidase Antibody <10 <35 IU/mL   T4 free   Result Value Ref Range    T4 Free 0.94 0.76 - 1.46 ng/dL       If you have any questions or concerns, please call the clinic at the number listed above.       Sincerely,    Aubrey Delgado PA-C/vielka

## 2017-10-03 NOTE — MR AVS SNAPSHOT
"              After Visit Summary   10/3/2017    Camilo Young    MRN: [id number]           Patient Information     Date Of Birth          3/2/1934        Visit Information        Provider Department      10/3/2017 10:20 AM Aubrey Delgado PA-C [facility] [place]        Today's Diagnoses     Essential hypertension with goal blood pressure less than 140/90    -  1    Hyperlipidemia LDL goal <70        Chronic atrial fibrillation (H)        Hypertension goal BP (blood pressure) < 140/90        Elevated TSH        Type 2 diabetes mellitus with stage 3 chronic kidney disease, without long-term current use of insulin (H)        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)           Follow-ups after your visit        Who to contact     Normal or non-critical lab and imaging results will be communicated to you by Xylos Corporationhart, letter or phone within 4 business days after the clinic has received the results. If you do not hear from us within 7 days, please contact the clinic through Xylos [employer]hart or phone. If you have a critical or abnormal lab result, we will notify you by phone as soon as possible.  Submit refill requests through Mantis Deposition or call your pharmacy and they will forward the refill request to us. Please allow 3 business days for your refill to be completed.          If you need to speak with a  for additional information , please call: [phone]             Additional Information About Your Visit        Mantis Deposition Information     Mantis Deposition lets you send messages to your doctor, view your test results, renew your prescriptions, schedule appointments and more. To sign up, go to [email or web address] . Click on \"Log in\" on the left side of the screen, which will take you to the Welcome page. Then click on \"Sign up Now\" on the right side of the page.     You will be asked to enter the access code listed below, as well as some personal information. Please follow the directions to create your " username and password.     Your access code is: [email or web address]  Expires: 2017  9:14 AM     Your access code will  in 90 days. If you need help or a new code, please call your [facility] or [phone].        Care EveryWhere ID     This is your Care EveryWhere ID. This could be used by other organizations to access your [place] medical records  [id number]        Your Vitals Were     Pulse Temperature Pulse Oximetry BMI (Body Mass Index)          87 98  F (36.7  C) (Oral) 96% 31.18 kg/m2         Blood Pressure from Last 3 Encounters:   10/03/17 136/73   17 128/76   16 122/72    Weight from Last 3 Encounters:   10/03/17 211 lb (95.7 kg)   17 221 lb 3.2 oz (100.3 kg)   17 212 lb (96.2 kg)              We Performed the Following     Albumin Random Urine Quantitative with Creat Ratio     FOOT EXAM     Hemoglobin A1c     Thyroid peroxidase antibody     TSH with free T4 reflex          Today's Medication Changes          These changes are accurate as of: 10/3/17 11:28 AM.  If you have any questions, ask your nurse or doctor.               Start taking these medicines.        Dose/Directions    glipiZIDE 10 MG 24 hr tablet   Commonly known as:  GLUCOTROL XL   Used for:  Type 2 diabetes mellitus with stage 3 chronic kidney disease, without long-term current use of insulin (H)   Started by:  Aubrey Delgado PA-C        Dose:  10 mg   Take 1 tablet (10 mg) by mouth daily   Quantity:  90 tablet   Refills:  0       losartan 25 MG tablet   Commonly known as:  COZAAR   Used for:  Hypertension goal BP (blood pressure) < 140/90   Started by:  Aubrey Delgado PA-C        Dose:  25 mg   Take 1 tablet (25 mg) by mouth daily   Quantity:  90 tablet   Refills:  1            Where to get your medicines      These medications were sent to [id number] - [place], [place] - [address]  [address] [place] [address]     Phone:  [phone]     amLODIPine 10 MG tablet     glipiZIDE 10 MG 24 hr tablet    losartan 25 MG tablet    metFORMIN 1000 MG tablet    simvastatin 40 MG tablet         Some of these will need a paper prescription and others can be bought over the counter.  Ask your nurse if you have questions.     Bring a paper prescription for each of these medications     budesonide-formoterol 160-4.5 MCG/ACT Inhaler                Primary Care Provider Office Phone # Fax #    Aubrey Alalize Delgado PA-C [phone]       [address] [facility] [address]        Equal Access to Services     [facility]: Hadii aad ku hadasho Soomaali, waaxda luqadaha, qaybta kaalmada adeegyada, waxay idiin hayaan linh machaod . So [facility] [phone].    ATENCIÓN: Si habla español, tiene a wright disposición servicios gratuitos de asistencia lingüística. [facility] [phone].    We comply with applicable federal civil rights laws and [place] laws. We do not discriminate on the basis of race, color, national origin, age, disability, sex, sexual orientation, or gender identity.            Thank you!     Thank you for choosing [facility]  for your care. Our goal is always to provide you with excellent care. Hearing back from our patients is one way we can continue to improve our services. Please take a few minutes to complete the written survey that you may receive in the mail after your visit with us. Thank you!             Your Updated Medication List - Protect others around you: Learn how to safely use, store and throw away your medicines at www.disposemymeds.org.          This list is accurate as of: 10/3/17 11:28 AM.  Always use your most recent med list.                   Brand Name Dispense Instructions for use Diagnosis    amLODIPine 10 MG tablet    NORVASC    90 tablet    Take 1 tablet (10 mg) by mouth daily    Essential hypertension with goal blood pressure less than 140/90       apixaban ANTICOAGULANT 2.5 MG tablet    ELIQUIS    180 tablet    Take 1  tablet (2.5 mg) by mouth 2 times daily        aspirin 81 MG tablet      Take 81 mg by mouth daily        blood glucose monitoring lancets     1 Box    1 each 3 times daily    Type 2 diabetes, HbA1c goal < 7% (H)       blood glucose monitoring test strip    ACCU-CHEK SMARTVIEW    3 Month    1 strip by In Vitro route 3 times daily Or as recommended by provider    Diabetes mellitus type 2, uncomplicated (H)       budesonide-formoterol 160-4.5 MCG/ACT Inhaler    SYMBICORT    3 Inhaler    Inhale 2 puffs into the lungs 2 times daily    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)       glipiZIDE 10 MG 24 hr tablet    GLUCOTROL XL    90 tablet    Take 1 tablet (10 mg) by mouth daily    Type 2 diabetes mellitus with stage 3 chronic kidney disease, without long-term current use of insulin (H)       losartan 25 MG tablet    COZAAR    90 tablet    Take 1 tablet (25 mg) by mouth daily    Hypertension goal BP (blood pressure) < 140/90       metFORMIN 1000 MG tablet    GLUCOPHAGE    180 tablet    Take 1 tablet (1,000 mg) by mouth 2 times daily (with meals)    Type 2 diabetes mellitus with stage 3 chronic kidney disease, without long-term current use of insulin (H)       simvastatin 40 MG tablet    ZOCOR    90 tablet    Take 1 tablet (40 mg) by mouth At Bedtime    Hyperlipidemia LDL goal <70

## 2017-10-03 NOTE — PROGRESS NOTES
SUBJECTIVE:   Camilo Young is a 83 year old male who presents to clinic today for the following health issues:      Diabetes Follow-up      Patient is checking blood sugars: Have not taken for a month    Diabetic concerns: None     Symptoms of hypoglycemia (low blood sugar): none     Paresthesias (numbness or burning in feet) or sores: No     Date of last diabetic eye exam: 5 years ago    Hyperlipidemia Follow-Up      Rate your low fat/cholesterol diet?: not monitoring fat    Taking statin?  Yes, no muscle aches from statin     Other lipid medications/supplements?:  none    Hypertension Follow-up      Outpatient blood pressures are not being checked.    Low Salt Diet: not monitoring salt        Amount of exercise or physical activity: 6-7 days/week for an average of 15-30 minutes    Problems taking medications regularly: No    Medication side effects: none  Diet: regular (no restrictions)          Problem list and histories reviewed & adjusted, as indicated.  Additional history: as documented    Patient Active Problem List   Diagnosis     COPD (chronic obstructive pulmonary disease) (H)     Obesity     Advanced directives, counseling/discussion     Cataract     H/O: CVA (cerebrovascular accident)     CTS (carpal tunnel syndrome)     Primary osteoarthritis of left knee     MMT (medial meniscus tear)     Hyperlipidemia LDL goal <70     Essential hypertension with goal blood pressure less than 140/90     Chronic atrial fibrillation (H)     Type 2 diabetes mellitus with stage 2 chronic kidney disease, without long-term current use of insulin (H)     Past Surgical History:   Procedure Laterality Date     DECOMPRESSION, FUSION LUMBAR POSTERIOR ONE LEVEL, COMBINED         Social History   Substance Use Topics     Smoking status: Former Smoker     Quit date: 5/16/1991     Smokeless tobacco: Never Used     Alcohol use No     Family History   Problem Relation Age of Onset     Cancer - colorectal Mother      C.A.D. Sister       CANCER Sister          Current Outpatient Prescriptions   Medication Sig Dispense Refill     losartan (COZAAR) 25 MG tablet Take 1 tablet (25 mg) by mouth daily 30 tablet 0     budesonide-formoterol (SYMBICORT) 160-4.5 MCG/ACT Inhaler Inhale 2 puffs into the lungs 2 times daily 3 Inhaler 3     aspirin 81 MG tablet Take 81 mg by mouth daily       amLODIPine (NORVASC) 10 MG tablet Take 1 tablet (10 mg) by mouth daily 90 tablet 1     glipiZIDE (GLUCOTROL XL) 2.5 MG 24 hr tablet Take 2 tabs in the morning prior to breakfast and 1 prior to supper 270 tablet 1     metFORMIN (GLUCOPHAGE) 1000 MG tablet Take 1 tablet (1,000 mg) by mouth 2 times daily (with meals) 180 tablet 1     simvastatin (ZOCOR) 40 MG tablet Take 1 tablet (40 mg) by mouth At Bedtime 90 tablet 1     apixaban ANTICOAGULANT (ELIQUIS) 2.5 MG tablet Take 1 tablet (2.5 mg) by mouth 2 times daily 180 tablet 3     blood glucose monitoring (ACCU-CHEK SMARTVIEW) test strip 1 strip by In Vitro route 3 times daily Or as recommended by provider 3 Month 1     blood glucose monitoring (ACCU-CHEK FASTCLIX) lancets 1 each 3 times daily 1 Box 5     No Known Allergies  Recent Labs   Lab Test  03/17/17   1042  09/20/16   1432  03/18/16   0947   06/12/15   0808  05/18/15   1240   A1C  7.4*  7.6*  7.2*   < >   --   9.8*   LDL  75   --   66   --   83   --    HDL  51   --   69   --   66   --    TRIG  99   --   75   --   69   --    CR  1.32*  1.17  1.03   < >   --   1.03   GFRESTIMATED  52*  60*  69   < >   --   69   GFRESTBLACK  63  72  84   < >   --   84   POTASSIUM  4.6  4.7  4.3   < >   --   4.1   TSH  4.57*   --    --    --    --   5.70*    < > = values in this interval not displayed.      BP Readings from Last 3 Encounters:   10/03/17 136/73   03/17/17 128/76   09/20/16 122/72    Wt Readings from Last 3 Encounters:   10/03/17 211 lb (95.7 kg)   09/05/17 221 lb 3.2 oz (100.3 kg)   03/17/17 212 lb (96.2 kg)                          Reviewed and updated as needed this  visit by clinical staff     Reviewed and updated as needed this visit by Provider         All other systems negative except as outline above  OBJECTIVE:  Eye exam - right eye normal lid, conjunctiva, cornea, pupil and fundus, left eye normal lid, conjunctiva, cornea, pupil and fundus.  Thyroid not palpable, not enlarged, no nodules detected.  CHEST:chest clear to IPPA, no tachypnea, retractions or cyanosis and S1, S2 normal, no murmur, no gallop, rate regular.  Foot exam - both sides normal; no swelling, tenderness or skin or vascular lesions. Color and temperature is normal. Sensation is intact. Peripheral pulses are palpable. Toenails are normal.    Camilo was seen today for recheck medication.    Diagnoses and all orders for this visit:    Essential hypertension with goal blood pressure less than 140/90  -     amLODIPine (NORVASC) 10 MG tablet; Take 1 tablet (10 mg) by mouth daily    Hyperlipidemia LDL goal <70  -     simvastatin (ZOCOR) 40 MG tablet; Take 1 tablet (40 mg) by mouth At Bedtime    Chronic atrial fibrillation (H)    Hypertension goal BP (blood pressure) < 140/90  -     Discontinue: losartan (COZAAR) 25 MG tablet; Take 1 tablet (25 mg) by mouth daily  -     losartan (COZAAR) 25 MG tablet; Take 1 tablet (25 mg) by mouth daily    Elevated TSH  -     TSH with free T4 reflex  -     Thyroid peroxidase antibody    Type 2 diabetes mellitus with stage 3 chronic kidney disease, without long-term current use of insulin (H)  -     Hemoglobin A1c  -     Albumin Random Urine Quantitative with Creat Ratio  -     FOOT EXAM  -     Discontinue: glipiZIDE (GLUCOTROL XL) 2.5 MG 24 hr tablet; Take 2 tabs in the morning prior to breakfast and 1 prior to supper  -     metFORMIN (GLUCOPHAGE) 1000 MG tablet; Take 1 tablet (1,000 mg) by mouth 2 times daily (with meals)  -     glipiZIDE (GLUCOTROL XL) 10 MG 24 hr tablet; Take 1 tablet (10 mg) by mouth daily    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)  -      budesonide-formoterol (SYMBICORT) 160-4.5 MCG/ACT Inhaler; Inhale 2 puffs into the lungs 2 times daily      work on lifestyle modification.  Recheck in 3 mos .

## 2017-10-04 LAB — THYROPEROXIDASE AB SERPL-ACNC: <10 IU/ML

## 2018-01-29 DIAGNOSIS — E11.22 TYPE 2 DIABETES MELLITUS WITH STAGE 3 CHRONIC KIDNEY DISEASE, WITHOUT LONG-TERM CURRENT USE OF INSULIN (H): ICD-10-CM

## 2018-01-29 DIAGNOSIS — N18.30 TYPE 2 DIABETES MELLITUS WITH STAGE 3 CHRONIC KIDNEY DISEASE, WITHOUT LONG-TERM CURRENT USE OF INSULIN (H): ICD-10-CM

## 2018-01-29 NOTE — TELEPHONE ENCOUNTER
"Requested Prescriptions   Pending Prescriptions Disp Refills     glipiZIDE (GLUCOTROL XL) 10 MG 24 hr tablet [Pharmacy Med Name: GlipiZIDE ER Oral Tablet Extended Release 24 Hour 10 MG] 30 tablet 0    Last Written Prescription Date:  1-3-18  Last Fill Quantity: 30,  # refills: 0   [facility] Visit with [facility] [place]:  10-3-17   Future Office Visit:    Sig: TAKE ONE TABLET BY MOUTH ONCE DAILY.    Sulfonylurea Agents Failed    1/29/2018 12:22 PM       Failed - Patient has documented A1c within the specified period of time.    Recent Labs   Lab Test  10/03/17   1103   A1C  8.1*            Failed - Patient has a recent creatinine (normal) within the past 12 mos.    Recent Labs   Lab Test  03/17/17   1042   CR  1.32*            Passed - Patient's BP is less than 140/90    BP Readings from Last 3 Encounters:   10/03/17 136/73   03/17/17 128/76   09/20/16 122/72                Passed - Patient has documented LDL within the past 12 mos.    Recent Labs   Lab Test  03/17/17   1042   LDL  75            Passed - Patient has had a Microalbumin in the past 12 mos.    Recent Labs   Lab Test  10/03/17   1111   MICROL  107   UMALCR  67.30*            Passed - Patient is age 18 or older       Passed - Patient has had an appointment with authorizing provider within the past 6 mos. or  within next 30 days    Patient had office visit in the last 6 months or has a visit in the next 30 days with authorizing provider.  See \"Patient Info\" tab in inbasket, or \"Choose Columns\" in Meds & Orders section of the refill encounter.            "

## 2018-01-30 RX ORDER — GLIPIZIDE 10 MG/1
TABLET, FILM COATED, EXTENDED RELEASE ORAL
Qty: 30 TABLET | Refills: 0 | Status: SHIPPED | OUTPATIENT
Start: 2018-01-30 | End: 2018-02-01

## 2018-02-01 ENCOUNTER — OFFICE VISIT (OUTPATIENT)
Dept: FAMILY MEDICINE | Facility: CLINIC | Age: 83
End: 2018-02-01
Payer: COMMERCIAL

## 2018-02-01 ENCOUNTER — TELEPHONE (OUTPATIENT)
Dept: FAMILY MEDICINE | Facility: CLINIC | Age: 83
End: 2018-02-01

## 2018-02-01 VITALS
HEIGHT: 68 IN | SYSTOLIC BLOOD PRESSURE: 138 MMHG | WEIGHT: 222 LBS | RESPIRATION RATE: 16 BRPM | BODY MASS INDEX: 33.65 KG/M2 | HEART RATE: 87 BPM | DIASTOLIC BLOOD PRESSURE: 74 MMHG | OXYGEN SATURATION: 96 %

## 2018-02-01 DIAGNOSIS — R09.89 DECREASED PULSES IN FEET: ICD-10-CM

## 2018-02-01 DIAGNOSIS — E11.22 TYPE 2 DIABETES MELLITUS WITH STAGE 3 CHRONIC KIDNEY DISEASE, WITHOUT LONG-TERM CURRENT USE OF INSULIN (H): ICD-10-CM

## 2018-02-01 DIAGNOSIS — J41.8 MIXED SIMPLE AND MUCOPURULENT CHRONIC BRONCHITIS (H): ICD-10-CM

## 2018-02-01 DIAGNOSIS — H25.13 AGE-RELATED NUCLEAR CATARACT OF BOTH EYES: ICD-10-CM

## 2018-02-01 DIAGNOSIS — N18.30 TYPE 2 DIABETES MELLITUS WITH STAGE 3 CHRONIC KIDNEY DISEASE, WITHOUT LONG-TERM CURRENT USE OF INSULIN (H): ICD-10-CM

## 2018-02-01 DIAGNOSIS — E78.5 HYPERLIPIDEMIA LDL GOAL <70: ICD-10-CM

## 2018-02-01 DIAGNOSIS — I10 HYPERTENSION GOAL BP (BLOOD PRESSURE) < 140/90: ICD-10-CM

## 2018-02-01 DIAGNOSIS — I10 ESSENTIAL HYPERTENSION WITH GOAL BLOOD PRESSURE LESS THAN 140/90: ICD-10-CM

## 2018-02-01 DIAGNOSIS — I48.20 CHRONIC ATRIAL FIBRILLATION (H): Primary | ICD-10-CM

## 2018-02-01 LAB
ANION GAP SERPL CALCULATED.3IONS-SCNC: 11 MMOL/L (ref 3–14)
BUN SERPL-MCNC: 23 MG/DL (ref 7–30)
CALCIUM SERPL-MCNC: 8.9 MG/DL (ref 8.5–10.1)
CHLORIDE SERPL-SCNC: 107 MMOL/L (ref 94–109)
CO2 SERPL-SCNC: 24 MMOL/L (ref 20–32)
CREAT SERPL-MCNC: 1.34 MG/DL (ref 0.66–1.25)
GFR SERPL CREATININE-BSD FRML MDRD: 51 ML/MIN/1.7M2
GLUCOSE SERPL-MCNC: 98 MG/DL (ref 70–99)
HBA1C MFR BLD: 8.8 % (ref 4.3–6)
POTASSIUM SERPL-SCNC: 4.2 MMOL/L (ref 3.4–5.3)
SODIUM SERPL-SCNC: 142 MMOL/L (ref 133–144)

## 2018-02-01 PROCEDURE — 36415 COLL VENOUS BLD VENIPUNCTURE: CPT | Performed by: PHYSICIAN ASSISTANT

## 2018-02-01 PROCEDURE — 80048 BASIC METABOLIC PNL TOTAL CA: CPT | Performed by: PHYSICIAN ASSISTANT

## 2018-02-01 PROCEDURE — 83036 HEMOGLOBIN GLYCOSYLATED A1C: CPT | Performed by: PHYSICIAN ASSISTANT

## 2018-02-01 PROCEDURE — 99214 OFFICE O/P EST MOD 30 MIN: CPT | Performed by: PHYSICIAN ASSISTANT

## 2018-02-01 RX ORDER — BUDESONIDE AND FORMOTEROL FUMARATE DIHYDRATE 160; 4.5 UG/1; UG/1
2 AEROSOL RESPIRATORY (INHALATION) 2 TIMES DAILY
Qty: 3 INHALER | Refills: 3 | Status: SHIPPED | OUTPATIENT
Start: 2018-02-01 | End: 2018-05-21

## 2018-02-01 RX ORDER — GLIPIZIDE 10 MG/1
TABLET, FILM COATED, EXTENDED RELEASE ORAL
Qty: 90 TABLET | Refills: 1 | Status: SHIPPED | OUTPATIENT
Start: 2018-02-01 | End: 2018-02-01

## 2018-02-01 RX ORDER — GLIPIZIDE 10 MG/1
20 TABLET, FILM COATED, EXTENDED RELEASE ORAL DAILY
Qty: 180 TABLET | Refills: 0 | Status: SHIPPED | OUTPATIENT
Start: 2018-02-01 | End: 2018-05-21

## 2018-02-01 RX ORDER — LOSARTAN POTASSIUM 25 MG/1
25 TABLET ORAL DAILY
Qty: 90 TABLET | Refills: 1 | Status: SHIPPED | OUTPATIENT
Start: 2018-02-01 | End: 2018-05-21

## 2018-02-01 RX ORDER — AMLODIPINE BESYLATE 10 MG/1
10 TABLET ORAL DAILY
Qty: 90 TABLET | Refills: 1 | Status: SHIPPED | OUTPATIENT
Start: 2018-02-01 | End: 2018-05-21

## 2018-02-01 RX ORDER — SIMVASTATIN 40 MG
40 TABLET ORAL AT BEDTIME
Qty: 90 TABLET | Refills: 1 | Status: SHIPPED | OUTPATIENT
Start: 2018-02-01 | End: 2018-05-21

## 2018-02-01 NOTE — TELEPHONE ENCOUNTER
Spoke with pharmacy to clarify script sent by Aubrey coleman-   glipiZIDE (GLUCOTROL XL) 10 MG 24 hr tablet 180 tablet 0 2/1/2018  No     Sig: Take 2 tablets (20 mg) by mouth daily     Class: E-Prescribe     Route: Oral

## 2018-02-01 NOTE — TELEPHONE ENCOUNTER
[facility] calling to get clarification on the medication Glipizide ER 10mg. They received two sets of directions and need to know which one to use.  Take one a day. OR Take two a day. Call to discuss and advise. Thank you

## 2018-02-01 NOTE — MR AVS SNAPSHOT
After Visit Summary   2/1/2018    Camilo Young    MRN: [id number]           Patient Information     Date Of Birth          3/2/1934        Visit Information        Provider Department      2/1/2018 11:40 AM Aubrey Delgado PA-C 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  Call member services at your health plan with any benefit or coverage questions.      Please bring the following with you to your appointment:    (1) Any X-Rays, CTs or MRIs which have been performed.  Contact the facility where they were done to arrange for  prior to your scheduled appointment.    (2) List of current medications  (3) This referral request   (4) Any documents/labs given to you for this referral                  Future tests that were ordered for you today     Open Future Orders        Priority Expected Expires Ordered    US IVETH Doppler No Exercise Routine  2/1/2019 2/1/2018            Who to contact     Normal or non-critical lab and imaging results will be communicated to you by MyChart, letter or phone within 4 business days after the clinic has received the results. If you do not hear from us within 7 days, please  "contact the clinic through Translimit or phone. If you have a critical or abnormal lab result, we will notify you by phone as soon as possible.  Submit refill requests through Translimit or call your pharmacy and they will forward the refill request to us. Please allow 3 business days for your refill to be completed.          If you need to speak with a  for additional information , please call: [phone]             Additional Information About Your Visit        Translimit Information     Translimit lets you send messages to your doctor, view your test results, renew your prescriptions, schedule appointments and more. To sign up, go to [email or web address] . Click on \"Log in\" on the left side of the screen, which will take you to the Welcome page. Then click on \"Sign up Now\" on the right side of the page.     You will be asked to enter the access code listed below, as well as some personal information. Please follow the directions to create your username and password.     Your access code is: [email or web address]  Expires: 2018 12:53 PM     Your access code will  in 90 days. If you need help or a new code, please call your [facility] clinic or [phone].        Care EveryWhere ID     This is your Care EveryWhere ID. This could be used by other organizations to access your [facility] medical records  [id number]        Your Vitals Were     Pulse Respirations Height Pulse Oximetry BMI (Body Mass Index)       87 16 5' 8\" (1.727 m) 96% 33.75 kg/m2        Blood Pressure from Last 3 Encounters:   18 138/74   10/03/17 136/73   17 128/76    Weight from Last 3 Encounters:   18 222 lb (100.7 kg)   10/03/17 211 lb (95.7 kg)   17 221 lb 3.2 oz (100.3 kg)              We Performed the Following     Basic metabolic panel  (Ca, Cl, CO2, Creat, Gluc, K, Na, BUN)     Hemoglobin A1c     OPHTHALMOLOGY ADULT REFERRAL          Today's Medication Changes          These changes are accurate as of " 2/1/18 12:53 PM.  If you have any questions, ask your nurse or doctor.               Start taking these medicines.        Dose/Directions    glipiZIDE 10 MG 24 hr tablet   Commonly known as:  GLUCOTROL XL   Used for:  Type 2 diabetes mellitus with stage 3 chronic kidney disease, without long-term current use of insulin (H)   Started by:  Aubrey Delgado PA-C        Dose:  20 mg   Take 2 tablets (20 mg) by mouth daily   Quantity:  180 tablet   Refills:  0       order for DME   Used for:  Type 2 diabetes mellitus with stage 3 chronic kidney disease, without long-term current use of insulin (H)   Started by:  Aubrey Delgado PA-C        Glucometer, brand as covered by insurance.   Quantity:  1 each   Refills:  0         These medicines have changed or have updated prescriptions.        Dose/Directions    * aspirin 81 MG tablet   This may have changed:  Another medication with the same name was added. Make sure you understand how and when to take each.   Changed by:  Aubrey Delgado PA-C        Dose:  81 mg   Take 81 mg by mouth daily   Refills:  0       * aspirin 81 MG EC tablet   This may have changed:  You were already taking a medication with the same name, and this prescription was added. Make sure you understand how and when to take each.   Used for:  Chronic atrial fibrillation (H), Type 2 diabetes mellitus with stage 3 chronic kidney disease, without long-term current use of insulin (H)   Changed by:  Aubrey Delgado PA-C        Dose:  81 mg   Take 1 tablet (81 mg) by mouth daily   Quantity:  90 tablet   Refills:  3       * Notice:  This list has 2 medication(s) that are the same as other medications prescribed for you. Read the directions carefully, and ask your doctor or other care provider to review them with you.      Stop taking these medicines if you haven't already. Please contact your care team if you have questions.     apixaban ANTICOAGULANT 2.5 MG tablet   Commonly known as:  ELIQUIS    Stopped by:  Aubrey Delgado PA-C                Where to get your medicines      These medications were sent to [id number] - [place], [place] - [address]  [address] [place] [address]     Phone:  [phone]     amLODIPine 10 MG tablet    aspirin 81 MG EC tablet    budesonide-formoterol 160-4.5 MCG/ACT Inhaler    glipiZIDE 10 MG 24 hr tablet    losartan 25 MG tablet    metFORMIN 1000 MG tablet    simvastatin 40 MG tablet         Some of these will need a paper prescription and others can be bought over the counter.  Ask your nurse if you have questions.     Bring a paper prescription for each of these medications     order for DME                Primary Care Provider Office Phone # Fax #    Aubrey Delgado PA-C [phone] [address]  [place] [address]        Equal Access to Services     [facility]: Hadii aad ku hadasho Soomaali, waaxda luqadaha, qaybta kaalmada adeegyada, waxay efrenin hayaan linh machado . So [facility] [phone].    ATENCIÓN: Si habla español, tiene a wright disposición servicios gratuitos de asistencia lingüística. López al [phone].    We comply with applicable federal civil rights laws and [place] laws. We do not discriminate on the basis of race, color, national origin, age, disability, sex, sexual orientation, or gender identity.            Thank you!     Thank you for choosing [facility]  for your care. Our goal is always to provide you with excellent care. Hearing back from our patients is one way we can continue to improve our services. Please take a few minutes to complete the written survey that you may receive in the mail after your visit with us. Thank you!             Your Updated Medication List - Protect others around you: Learn how to safely use, store and throw away your medicines at www.disposemymeds.org.          This list is accurate as of 2/1/18 12:53 PM.  Always use your most recent  med list.                   Brand Name Dispense Instructions for use Diagnosis    amLODIPine 10 MG tablet    NORVASC    90 tablet    Take 1 tablet (10 mg) by mouth daily    Essential hypertension with goal blood pressure less than 140/90       * aspirin 81 MG tablet      Take 81 mg by mouth daily        * aspirin 81 MG EC tablet     90 tablet    Take 1 tablet (81 mg) by mouth daily    Chronic atrial fibrillation (H), Type 2 diabetes mellitus with stage 3 chronic kidney disease, without long-term current use of insulin (H)       blood glucose monitoring lancets     1 Box    1 each 3 times daily    Type 2 diabetes, HbA1c goal < 7% (H)       blood glucose monitoring test strip    ACCU-CHEK SMARTVIEW    3 Month    1 strip by In Vitro route 3 times daily Or as recommended by provider    Diabetes mellitus type 2, uncomplicated (H)       budesonide-formoterol 160-4.5 MCG/ACT Inhaler    SYMBICORT    3 Inhaler    Inhale 2 puffs into the lungs 2 times daily    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)       glipiZIDE 10 MG 24 hr tablet    GLUCOTROL XL    180 tablet    Take 2 tablets (20 mg) by mouth daily    Type 2 diabetes mellitus with stage 3 chronic kidney disease, without long-term current use of insulin (H)       losartan 25 MG tablet    COZAAR    90 tablet    Take 1 tablet (25 mg) by mouth daily    Hypertension goal BP (blood pressure) < 140/90       metFORMIN 1000 MG tablet    GLUCOPHAGE    180 tablet    Take 1 tablet (1,000 mg) by mouth 2 times daily (with meals)    Type 2 diabetes mellitus with stage 3 chronic kidney disease, without long-term current use of insulin (H)       order for DME     1 each    Glucometer, brand as covered by insurance.    Type 2 diabetes mellitus with stage 3 chronic kidney disease, without long-term current use of insulin (H)       simvastatin 40 MG tablet    ZOCOR    90 tablet    Take 1 tablet (40 mg) by mouth At Bedtime    Hyperlipidemia LDL goal <70       * Notice:  This  list has 2 medication(s) that are the same as other medications prescribed for you. Read the directions carefully, and ask your doctor or other care provider to review them with you.

## 2018-02-01 NOTE — PROGRESS NOTES
SUBJECTIVE:   Camilo Young is a 83 year old male who presents to clinic today for the following health issues:      Diabetes Follow-up      Patient is checking blood sugars: not at all    Diabetic concerns: None     Symptoms of hypoglycemia (low blood sugar): none     Paresthesias (numbness or burning in feet) or sores: No     Date of last diabetic eye exam: 2017    BP Readings from Last 2 Encounters:   02/01/18 138/74   10/03/17 136/73     Hemoglobin A1C (%)   Date Value   02/01/2018 8.8 (H)   10/03/2017 8.1 (H)     LDL Cholesterol Calculated (mg/dL)   Date Value   03/17/2017 75   03/18/2016 66       Amount of exercise or physical activity: None    Problems taking medications regularly: No    Medication side effects: none    Diet: regular (no restrictions)      Patient has history of a fib. He refuses to go on warfarin and doesn't want to take apixaban either. He only will take an aspirin.       Problem list and histories reviewed & adjusted, as indicated.  Additional history: as documented    BP Readings from Last 3 Encounters:   02/01/18 138/74   10/03/17 136/73   03/17/17 128/76    Wt Readings from Last 3 Encounters:   02/01/18 222 lb (100.7 kg)   10/03/17 211 lb (95.7 kg)   09/05/17 221 lb 3.2 oz (100.3 kg)                    Reviewed and updated as needed this visit by clinical staff  Tobacco  Allergies  Meds  Problems  Med Hx  Surg Hx  Fam Hx  Soc Hx        Reviewed and updated as needed this visit by Provider  Tobacco  Allergies  Meds  Problems  Med Hx  Surg Hx  Fam Hx  Soc Hx          All other systems negative except as outline above  OBJECTIVE:  Eye exam - right eye normal lid, conjunctiva, cornea, pupil and fundus, left eye normal lid, conjunctiva, cornea, pupil and fundus.  Thyroid not palpable, not enlarged, no nodules detected.  CHEST:no tachypnea, retractions or cyanosis, air entry reduced lung bases and Heart exam detail:irregularly irregular rhythm, chest is clear without rales or  wheezing, no pedal edema, no JVD, no hepatosplenomegaly.  Examination of the feet reveals no trophic changes or ulcerative lesions, normal sensory exam, DP reduced bilateral and PT reduced bilateral.  Camilo was seen today for diabetes.    Diagnoses and all orders for this visit:    Chronic atrial fibrillation (H)  -     aspirin 81 MG EC tablet; Take 1 tablet (81 mg) by mouth daily    Type 2 diabetes mellitus with stage 3 chronic kidney disease, without long-term current use of insulin (H)  -     Hemoglobin A1c  -     Discontinue: glipiZIDE (GLUCOTROL XL) 10 MG 24 hr tablet; TAKE ONE TABLET BY MOUTH ONCE DAILY.  -     metFORMIN (GLUCOPHAGE) 1000 MG tablet; Take 1 tablet (1,000 mg) by mouth 2 times daily (with meals)  -     order for DME; Glucometer, brand as covered by insurance.  -     OPHTHALMOLOGY ADULT REFERRAL  -     aspirin 81 MG EC tablet; Take 1 tablet (81 mg) by mouth daily  -     Discontinue: glipiZIDE (GLUCOTROL XL) 10 MG 24 hr tablet; TAKE ONE TABLET BY MOUTH ONCE DAILY.  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)  -     budesonide-formoterol (SYMBICORT) 160-4.5 MCG/ACT Inhaler; Inhale 2 puffs into the lungs 2 times daily    Hypertension goal BP (blood pressure) < 140/90  -     losartan (COZAAR) 25 MG tablet; Take 1 tablet (25 mg) by mouth daily    Age-related nuclear cataract of both eyes  -     OPHTHALMOLOGY ADULT REFERRAL    Decreased pulses in feet  -     US IVETH Doppler No Exercise; Future    Other orders  -     Cancel: apixaban ANTICOAGULANT (ELIQUIS) 2.5 MG tablet; Take 1  tablet (2.5 mg) by mouth 2 times daily  -     Cancel: apixaban ANTICOAGULANT (ELIQUIS) 2.5 MG tablet; Take 1 tablet (2.5 mg) by mouth 2 times daily        Patient to cut out his doughnut consumption . Recheck in 3 mos . work on lifestyle modification

## 2018-02-01 NOTE — LETTER
February 26, 2018      Camilo Young  [address]  [place] [address]        Dear ,    We are writing to inform you of your test results.    Your recent kidney function remains nice and stable. Make the changes we discussed to help improve your blood sugar control.     Remember to see me in 2 months for a recheck of your diabetes.       Resulted Orders   Hemoglobin A1c   Result Value Ref Range    Hemoglobin A1C 8.8 (H) 4.3 - 6.0 %   Basic metabolic panel  (Ca, Cl, CO2, Creat, Gluc, K, Na, BUN)   Result Value Ref Range    Sodium 142 133 - 144 mmol/L    Potassium 4.2 3.4 - 5.3 mmol/L    Chloride 107 94 - 109 mmol/L    Carbon Dioxide 24 20 - 32 mmol/L    Anion Gap 11 3 - 14 mmol/L    Glucose 98 70 - 99 mg/dL    Urea Nitrogen 23 7 - 30 mg/dL    Creatinine 1.34 (H) 0.66 - 1.25 mg/dL    GFR Estimate 51 (L) >60 mL/min/1.7m2      Comment:      Non  GFR Calc    GFR Estimate If Black 61 >60 mL/min/1.7m2      Comment:       GFR Calc    Calcium 8.9 8.5 - 10.1 mg/dL     If you have any questions or concerns, please call the clinic at the number listed above.     Sincerely,      Aubrey Delgado PA-C/vielka

## 2018-02-03 ENCOUNTER — RADIANT APPOINTMENT (OUTPATIENT)
Dept: ULTRASOUND IMAGING | Facility: CLINIC | Age: 83
End: 2018-02-03
Attending: PHYSICIAN ASSISTANT
Payer: COMMERCIAL

## 2018-02-03 DIAGNOSIS — R09.89 DECREASED PULSES IN FEET: ICD-10-CM

## 2018-02-03 PROCEDURE — 93922 UPR/L XTREMITY ART 2 LEVELS: CPT

## 2018-02-21 ENCOUNTER — TELEPHONE (OUTPATIENT)
Dept: FAMILY MEDICINE | Facility: CLINIC | Age: 83
End: 2018-02-21

## 2018-02-21 NOTE — TELEPHONE ENCOUNTER
Spoke with [facility] pharmacy.  Unsure why but they still had new RX on hold.  Clarified again that RX was changed.     Disp Refills Start End GERRY      glipiZIDE (GLUCOTROL XL) 10 MG 24 hr tablet 180 tablet 0 2/1/2018  No     Sig: Take 2 tablets (20 mg) by mouth daily     Class: E-Prescribe     Route: Oral     Order: [id number]     E-Prescribing Status: Receipt confirmed by pharmacy (2/1/2018 12:52 PM CST)

## 2018-02-21 NOTE — TELEPHONE ENCOUNTER
PT STATES THERE IS A SIGNATURE CHANGE ON GLIPIZIDE.  [facility] -[phone]

## 2018-04-23 ENCOUNTER — DOCUMENTATION ONLY (OUTPATIENT)
Dept: LAB | Facility: CLINIC | Age: 83
End: 2018-04-23

## 2018-04-23 DIAGNOSIS — E11.22 TYPE 2 DIABETES MELLITUS WITH STAGE 3 CHRONIC KIDNEY DISEASE, WITHOUT LONG-TERM CURRENT USE OF INSULIN (H): Primary | ICD-10-CM

## 2018-04-23 DIAGNOSIS — E78.5 HYPERLIPIDEMIA LDL GOAL <70: ICD-10-CM

## 2018-04-23 DIAGNOSIS — N18.30 TYPE 2 DIABETES MELLITUS WITH STAGE 3 CHRONIC KIDNEY DISEASE, WITHOUT LONG-TERM CURRENT USE OF INSULIN (H): Primary | ICD-10-CM

## 2018-04-23 DIAGNOSIS — I10 ESSENTIAL HYPERTENSION WITH GOAL BLOOD PRESSURE LESS THAN 140/90: ICD-10-CM

## 2018-04-23 NOTE — PROGRESS NOTES
Please place or confirm orders for upcoming lab appointment on 4/23/18    Thank you,  Celine Sarah

## 2018-04-24 DIAGNOSIS — I10 ESSENTIAL HYPERTENSION WITH GOAL BLOOD PRESSURE LESS THAN 140/90: ICD-10-CM

## 2018-04-24 DIAGNOSIS — N18.30 TYPE 2 DIABETES MELLITUS WITH STAGE 3 CHRONIC KIDNEY DISEASE, WITHOUT LONG-TERM CURRENT USE OF INSULIN (H): ICD-10-CM

## 2018-04-24 DIAGNOSIS — E11.22 TYPE 2 DIABETES MELLITUS WITH STAGE 3 CHRONIC KIDNEY DISEASE, WITHOUT LONG-TERM CURRENT USE OF INSULIN (H): ICD-10-CM

## 2018-04-24 DIAGNOSIS — E78.5 HYPERLIPIDEMIA LDL GOAL <70: ICD-10-CM

## 2018-04-24 LAB
ANION GAP SERPL CALCULATED.3IONS-SCNC: 10 MMOL/L (ref 3–14)
BUN SERPL-MCNC: 17 MG/DL (ref 7–30)
CALCIUM SERPL-MCNC: 9 MG/DL (ref 8.5–10.1)
CHLORIDE SERPL-SCNC: 105 MMOL/L (ref 94–109)
CHOLEST SERPL-MCNC: 119 MG/DL
CO2 SERPL-SCNC: 24 MMOL/L (ref 20–32)
CREAT SERPL-MCNC: 1.11 MG/DL (ref 0.66–1.25)
GFR SERPL CREATININE-BSD FRML MDRD: 63 ML/MIN/1.7M2
GLUCOSE SERPL-MCNC: 131 MG/DL (ref 70–99)
HBA1C MFR BLD: 7.9 % (ref 0–5.6)
HDLC SERPL-MCNC: 50 MG/DL
LDLC SERPL CALC-MCNC: 51 MG/DL
NONHDLC SERPL-MCNC: 69 MG/DL
POTASSIUM SERPL-SCNC: 4.6 MMOL/L (ref 3.4–5.3)
SODIUM SERPL-SCNC: 139 MMOL/L (ref 133–144)
TRIGL SERPL-MCNC: 90 MG/DL

## 2018-04-24 PROCEDURE — 80061 LIPID PANEL: CPT | Performed by: PHYSICIAN ASSISTANT

## 2018-04-24 PROCEDURE — 36415 COLL VENOUS BLD VENIPUNCTURE: CPT | Performed by: PHYSICIAN ASSISTANT

## 2018-04-24 PROCEDURE — 80048 BASIC METABOLIC PNL TOTAL CA: CPT | Performed by: PHYSICIAN ASSISTANT

## 2018-04-24 PROCEDURE — 83036 HEMOGLOBIN GLYCOSYLATED A1C: CPT | Performed by: PHYSICIAN ASSISTANT

## 2018-05-03 ENCOUNTER — OFFICE VISIT (OUTPATIENT)
Dept: OTOLARYNGOLOGY | Facility: CLINIC | Age: 83
End: 2018-05-03
Payer: COMMERCIAL

## 2018-05-03 ENCOUNTER — OFFICE VISIT (OUTPATIENT)
Dept: AUDIOLOGY | Facility: CLINIC | Age: 83
End: 2018-05-03
Payer: COMMERCIAL

## 2018-05-03 VITALS
DIASTOLIC BLOOD PRESSURE: 83 MMHG | BODY MASS INDEX: 31.34 KG/M2 | HEART RATE: 84 BPM | OXYGEN SATURATION: 95 % | HEIGHT: 68 IN | RESPIRATION RATE: 20 BRPM | WEIGHT: 206.8 LBS | SYSTOLIC BLOOD PRESSURE: 160 MMHG

## 2018-05-03 DIAGNOSIS — Z53.9 ERRONEOUS ENCOUNTER--DISREGARD: Primary | ICD-10-CM

## 2018-05-03 DIAGNOSIS — H65.01 RIGHT ACUTE SEROUS OTITIS MEDIA, RECURRENCE NOT SPECIFIED: Primary | ICD-10-CM

## 2018-05-03 PROCEDURE — 99203 OFFICE O/P NEW LOW 30 MIN: CPT | Performed by: OTOLARYNGOLOGY

## 2018-05-03 NOTE — MR AVS SNAPSHOT
After Visit Summary   5/3/2018    Camilo Young    MRN: [id number]           Patient Information     Date Of Birth          3/2/1934        Visit Information        Provider Department      5/3/2018 9:00 AM Jess Smith MD [facility]        Today's Diagnoses     Right acute serous otitis media, recurrence not specified    -  1      Care Instructions    Scheduling Information  To schedule your CT/MRI scan, please contact [facility] at [phone] OR [facility] at [phone]    To schedule your Surgery, please contact our Specialty Schedulers at [phone]    ** If a CT scan or biopsy were ordered/done, Dr. Smith will need to see you back in clinic to go over your biopsy results or CT/MRI scan. He will go over the images from your scan with you and discuss treatment based on your results.     ENT Clinic Locations Clinic Hours Telephone Number     [facility]  [address]  [place] [address]   E/O Thursday:      7:30am -- 4:00pm   To schedule/reschedule an appointment with   Dr. Smith,   please contact our   Specialty Scheduling Department at:     [phone]       [facility]  [address] [place] [address]     Monday:              12:00pm -- 4:00pm    Tuesday:               8:30am -- 4:30pm    Wednesday:        12:00pm -- 4:00pm    E/O Thursday:        8:00am - 4:30pm           Urgent Care Locations Clinic Hours Telephone Numbers     [facility]  [address] [place] [address]     Monday-Friday:     11:00am - 9:00pm    Saturday-Sunday:  9:00am - 5:00pm   [phone]     [facility] [place]  [address]   [place] [address]     Monday-Friday:      5:00pm - 9:00pm     Saturday-Sunday:  9:00am - 5:00pm   [phone]               Follow-ups after your visit        Additional Services     AUDIOLOGY ADULT REFERRAL                 Who to contact     If you have questions or need follow up information about  "today's clinic visit or your schedule please contact [facility] directly at [phone].  Normal or non-critical lab and imaging results will be communicated to you by [employer], letter or phone within 4 business days after the clinic has received the results. If you do not hear from us within 7 days, please contact the clinic through [employer] or phone. If you have a critical or abnormal lab result, we will notify you by phone as soon as possible.  Submit refill requests through [employer] or call your pharmacy and they will forward the refill request to us. Please allow 3 business days for your refill to be completed.          Additional Information About Your Visit        [facility] Information     [employer] lets you send messages to your doctor, view your test results, renew your prescriptions, schedule appointments and more. To sign up, go to [email or web address] . Click on \"Log in\" on the left side of the screen, which will take you to the Welcome page. Then click on \"Sign up Now\" on the right side of the page.     You will be asked to enter the access code listed below, as well as some personal information. Please follow the directions to create your username and password.     Your access code is: [email or web address]  Expires: 2018 11:26 AM     Your access code will  in 90 days. If you need help or a new code, please call your [facility] clinic or [phone].        Care EveryWhere ID     This is your Care EveryWhere ID. This could be used by other organizations to access your [facility] medical records  [id number]        Your Vitals Were     Pulse Respirations Height Pulse Oximetry BMI (Body Mass Index)       84 20 1.727 m (5' 8\") 95% 31.44 kg/m2        Blood Pressure from Last 3 Encounters:   18 160/83   18 138/74   10/03/17 136/73    Weight from Last 3 Encounters:   18 93.8 kg (206 lb 12.8 oz)   18 100.7 kg (222 lb)   10/03/17 95.7 kg (211 lb)              We Performed the " Following     AUDIOLOGY ADULT REFERRAL        Primary Care Provider Office Phone # Fax #    Aubrey Delgado PA-C [phone]       [address] [place] [address]        Equal Access to Services     DAXA VALENCIA : Hadii aad ku hadasho Soomaali, waaxda luqadaha, qaybta kaalmada adeegyada, waxay idiin hayaan adeeg usha lakellien . So [facility] [phone].    ATENCIÓN: Si habla español, tiene a wrigth disposición servicios gratuitos de asistencia lingüística. Llame al [phone].    We comply with applicable federal civil rights laws and [place] laws. We do not discriminate on the basis of race, color, national origin, age, disability, sex, sexual orientation, or gender identity.            Thank you!     Thank you for choosing [facility]  for your care. Our goal is always to provide you with excellent care. Hearing back from our patients is one way we can continue to improve our services. Please take a few minutes to complete the written survey that you may receive in the mail after your visit with us. Thank you!             Your Updated Medication List - Protect others around you: Learn how to safely use, store and throw away your medicines at www.disposemymeds.org.          This list is accurate as of 5/3/18  5:19 PM.  Always use your most recent med list.                   Brand Name Dispense Instructions for use Diagnosis    amLODIPine 10 MG tablet    NORVASC    90 tablet    Take 1 tablet (10 mg) by mouth daily    Essential hypertension with goal blood pressure less than 140/90       * aspirin 81 MG tablet      Take 81 mg by mouth daily        * aspirin 81 MG EC tablet     90 tablet    Take 1 tablet (81 mg) by mouth daily    Chronic atrial fibrillation (H), Type 2 diabetes mellitus with stage 3 chronic kidney disease, without long-term current use of insulin (H)       blood glucose monitoring lancets     1 Box    1 each 3 times daily    Type 2 diabetes, HbA1c goal < 7% (H)        blood glucose monitoring test strip    ACCU-CHEK SMARTVIEW    3 Month    1 strip by In Vitro route 3 times daily Or as recommended by provider    Diabetes mellitus type 2, uncomplicated (H)       budesonide-formoterol 160-4.5 MCG/ACT Inhaler    SYMBICORT    3 Inhaler    Inhale 2 puffs into the lungs 2 times daily    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)       glipiZIDE 10 MG 24 hr tablet    GLUCOTROL XL    180 tablet    Take 2 tablets (20 mg) by mouth daily    Type 2 diabetes mellitus with stage 3 chronic kidney disease, without long-term current use of insulin (H)       losartan 25 MG tablet    COZAAR    90 tablet    Take 1 tablet (25 mg) by mouth daily    Hypertension goal BP (blood pressure) < 140/90       metFORMIN 1000 MG tablet    GLUCOPHAGE    180 tablet    Take 1 tablet (1,000 mg) by mouth 2 times daily (with meals)    Type 2 diabetes mellitus with stage 3 chronic kidney disease, without long-term current use of insulin (H)       order for DME     1 each    Glucometer, brand as covered by insurance.    Type 2 diabetes mellitus with stage 3 chronic kidney disease, without long-term current use of insulin (H)       simvastatin 40 MG tablet    ZOCOR    90 tablet    Take 1 tablet (40 mg) by mouth At Bedtime    Hyperlipidemia LDL goal <70       * Notice:  This list has 2 medication(s) that are the same as other medications prescribed for you. Read the directions carefully, and ask your doctor or other care provider to review them with you.

## 2018-05-03 NOTE — PROGRESS NOTES
History of Present Illness - Camilo Young is a 84 year old male who presents with a 2 week history of feeling that his hearing is down on the right. He also has frequent ear itching. He has known hearing loss and has been advised to get hearing aids, but is not interested in them. He denies prior ear surgery.    Past Medical History -   Patient Active Problem List   Diagnosis     COPD (chronic obstructive pulmonary disease) (H)     Obesity     Advanced directives, counseling/discussion     Cataract     H/O: CVA (cerebrovascular accident)     CTS (carpal tunnel syndrome)     Primary osteoarthritis of left knee     MMT (medial meniscus tear)     Hyperlipidemia LDL goal <70     Essential hypertension with goal blood pressure less than 140/90     Chronic atrial fibrillation (H)     Type 2 diabetes mellitus with stage 3 chronic kidney disease, without long-term current use of insulin (H)     Decreased pulses in feet       Current Medications -   Current Outpatient Prescriptions:      amLODIPine (NORVASC) 10 MG tablet, Take 1 tablet (10 mg) by mouth daily, Disp: 90 tablet, Rfl: 1     aspirin 81 MG EC tablet, Take 1 tablet (81 mg) by mouth daily, Disp: 90 tablet, Rfl: 3     blood glucose monitoring (ACCU-CHEK FASTCLIX) lancets, 1 each 3 times daily, Disp: 1 Box, Rfl: 5     blood glucose monitoring (ACCU-CHEK SMARTVIEW) test strip, 1 strip by In Vitro route 3 times daily Or as recommended by provider, Disp: 3 Month, Rfl: 1     budesonide-formoterol (SYMBICORT) 160-4.5 MCG/ACT Inhaler, Inhale 2 puffs into the lungs 2 times daily, Disp: 3 Inhaler, Rfl: 3     glipiZIDE (GLUCOTROL XL) 10 MG 24 hr tablet, Take 2 tablets (20 mg) by mouth daily, Disp: 180 tablet, Rfl: 0     losartan (COZAAR) 25 MG tablet, Take 1 tablet (25 mg) by mouth daily, Disp: 90 tablet, Rfl: 1     metFORMIN (GLUCOPHAGE) 1000 MG tablet, Take 1 tablet (1,000 mg) by mouth 2 times daily (with meals), Disp: 180 tablet, Rfl: 1     order for DME, Glucometer,  "brand as covered by insurance., Disp: 1 each, Rfl: 0     simvastatin (ZOCOR) 40 MG tablet, Take 1 tablet (40 mg) by mouth At Bedtime, Disp: 90 tablet, Rfl: 1     aspirin 81 MG tablet, Take 81 mg by mouth daily, Disp: , Rfl:     Allergies - No Known Allergies    Social History -   Social History     Social History     Marital status:      Spouse name: N/A     Number of children: N/A     Years of education: N/A     Social History Main Topics     Smoking status: Former Smoker     Quit date: 5/16/1991     Smokeless tobacco: Never Used     Alcohol use No     Drug use: No     Sexual activity: No     Other Topics Concern     Not on file     Social History Narrative       Family History -   Family History   Problem Relation Age of Onset     Cancer - colorectal Mother      C.A.D. Sister      CANCER Sister        Review of Systems - As per HPI and PMHx, otherwise 7 system review of the head and neck negative. 10+ system review negative.    Physical Exam  /83 (Cuff Size: Adult Regular)  Pulse 84  Resp 20  Ht 1.727 m (5' 8\")  Wt 93.8 kg (206 lb 12.8 oz)  SpO2 95%  BMI 31.44 kg/m2  General - The patient is well nourished and well developed, and appears to have good nutritional status.  Alert and oriented to person and place, answers questions and cooperates with examination appropriately.   Head and Face - Normocephalic and atraumatic, with no gross asymmetry noted of the contour of the facial features.  The facial nerve is intact, with strong symmetric movements.  Voice and Breathing - The patient was breathing comfortably without the use of accessory muscles. There was no wheezing, stridor, or stertor.  The patients voice was clear and strong, and had appropriate pitch and quality.  Ears - left ear canal is clear, and TM intact without effusion. Right TM retracted with a serous effusion.  Eyes - Extraocular movements intact.  Sclera were not icteric or injected, conjunctiva were pink and moist.  Mouth - " Examination of the oral cavity showed pink, healthy oral mucosa. No lesions or ulcerations noted.  The tongue was mobile and midline, and the dentition were in good condition.    Throat - The walls of the oropharynx were smooth, pink, moist, symmetric, and had no lesions or ulcerations.  The tonsillar pillars and soft palate were symmetric.  The uvula was midline on elevation.  Neck - Normal midline excursion of the laryngotracheal complex during swallowing.  Full range of motion on passive movement.  Palpation of the occipital, submental, submandibular, internal jugular chain, and supraclavicular nodes did not demonstrate any abnormal lymph nodes or masses.  The carotid pulse was palpable bilaterally.  Palpation of the thyroid was soft and smooth, with no nodules or goiter appreciated.  The trachea was mobile and midline.  Nose - External contour is symmetric, no gross deflection or scars.  Nasal mucosa is pink and moist with no abnormal mucus.  The septum was midline and non-obstructive, turbinates of normal size and position.  No polyps, masses, or purulence noted on examination.          Assessment - Camilo Young is a 84 year old male with right serous otitis media. I recommended autoinsufflation for the next 4 weeks. Return with audiogram at that time and if the effusion is still an issue I can do a nasopharyngoscopy and a myringotomy.     Patient to follow up with Primary Care provider regarding elevated blood pressure.    Dr. Jess Smith MD  Otolaryngology  [facility]

## 2018-05-03 NOTE — PATIENT INSTRUCTIONS
Scheduling Information  To schedule your CT/MRI scan, please contact [facility] at [phone] OR [facility] at [phone]    To schedule your Surgery, please contact our Specialty Schedulers at [phone]    ** If a CT scan or biopsy were ordered/done, Dr. Smith will need to see you back in clinic to go over your biopsy results or CT/MRI scan. He will go over the images from your scan with you and discuss treatment based on your results.     ENT Clinic Locations Clinic Hours Telephone Number     [facility]  [address]  [place] [address]   E/O Thursday:      7:30am -- 4:00pm   To schedule/reschedule an appointment with   Dr. Smith,   please contact our   Specialty Scheduling Department at:     [phone]       [facility]  [address] [place] [address]     Monday:              12:00pm -- 4:00pm    Tuesday:               8:30am -- 4:30pm    Wednesday:        12:00pm -- 4:00pm    E/O Thursday:        8:00am - 4:30pm           Urgent Care Locations Clinic Hours Telephone Numbers     [facility] [place]  [address]  [place] [address]     Monday-Friday:     11:00am - 9:00pm    Saturday-Sunday:  9:00am - 5:00pm   [phone]     [facility]  [address] [place] [address]     Monday-Friday:      5:00pm - 9:00pm     Saturday-Sunday:  9:00am - 5:00pm   [phone]

## 2018-05-03 NOTE — LETTER
5/3/2018         RE: Camilo Young  [address] [place] [address]        Dear Colleague,    Thank you for referring your patient, Camilo Young, to the [facility]. Please see a copy of my visit note below.        History of Present Illness - Camilo Young is a 84 year old male who presents with a 2 week history of feeling that his hearing is down on the right. He also has frequent ear itching. He has known hearing loss and has been advised to get hearing aids, but is not interested in them. He denies prior ear surgery.    Past Medical History -   Patient Active Problem List   Diagnosis     COPD (chronic obstructive pulmonary disease) (H)     Obesity     Advanced directives, counseling/discussion     Cataract     H/O: CVA (cerebrovascular accident)     CTS (carpal tunnel syndrome)     Primary osteoarthritis of left knee     MMT (medial meniscus tear)     Hyperlipidemia LDL goal <70     Essential hypertension with goal blood pressure less than 140/90     Chronic atrial fibrillation (H)     Type 2 diabetes mellitus with stage 3 chronic kidney disease, without long-term current use of insulin (H)     Decreased pulses in feet       Current Medications -   Current Outpatient Prescriptions:      amLODIPine (NORVASC) 10 MG tablet, Take 1 tablet (10 mg) by mouth daily, Disp: 90 tablet, Rfl: 1     aspirin 81 MG EC tablet, Take 1 tablet (81 mg) by mouth daily, Disp: 90 tablet, Rfl: 3     blood glucose monitoring (ACCU-CHEK FASTCLIX) lancets, 1 each 3 times daily, Disp: 1 Box, Rfl: 5     blood glucose monitoring (ACCU-CHEK SMARTVIEW) test strip, 1 strip by In Vitro route 3 times daily Or as recommended by provider, Disp: 3 Month, Rfl: 1     budesonide-formoterol (SYMBICORT) 160-4.5 MCG/ACT Inhaler, Inhale 2 puffs into the lungs 2 times daily, Disp: 3 Inhaler, Rfl: 3     glipiZIDE (GLUCOTROL XL) 10 MG 24 hr tablet, Take 2 tablets (20 mg) by mouth daily, Disp: 180 tablet, Rfl: 0     losartan (COZAAR) 25 MG  "tablet, Take 1 tablet (25 mg) by mouth daily, Disp: 90 tablet, Rfl: 1     metFORMIN (GLUCOPHAGE) 1000 MG tablet, Take 1 tablet (1,000 mg) by mouth 2 times daily (with meals), Disp: 180 tablet, Rfl: 1     order for DME, Glucometer, brand as covered by insurance., Disp: 1 each, Rfl: 0     simvastatin (ZOCOR) 40 MG tablet, Take 1 tablet (40 mg) by mouth At Bedtime, Disp: 90 tablet, Rfl: 1     aspirin 81 MG tablet, Take 81 mg by mouth daily, Disp: , Rfl:     Allergies - No Known Allergies    Social History -   Social History     Social History     Marital status:      Spouse name: N/A     Number of children: N/A     Years of education: N/A     Social History Main Topics     Smoking status: Former Smoker     Quit date: 5/16/1991     Smokeless tobacco: Never Used     Alcohol use No     Drug use: No     Sexual activity: No     Other Topics Concern     Not on file     Social History Narrative       Family History -   Family History   Problem Relation Age of Onset     Cancer - colorectal Mother      C.A.D. Sister      CANCER Sister        Review of Systems - As per HPI and PMHx, otherwise 7 system review of the head and neck negative. 10+ system review negative.    Physical Exam  /83 (Cuff Size: Adult Regular)  Pulse 84  Resp 20  Ht 1.727 m (5' 8\")  Wt 93.8 kg (206 lb 12.8 oz)  SpO2 95%  BMI 31.44 kg/m2  General - The patient is well nourished and well developed, and appears to have good nutritional status.  Alert and oriented to person and place, answers questions and cooperates with examination appropriately.   Head and Face - Normocephalic and atraumatic, with no gross asymmetry noted of the contour of the facial features.  The facial nerve is intact, with strong symmetric movements.  Voice and Breathing - The patient was breathing comfortably without the use of accessory muscles. There was no wheezing, stridor, or stertor.  The patients voice was clear and strong, and had appropriate pitch and " quality.  Ears - left ear canal is clear, and TM intact without effusion. Right TM retracted with a serous effusion.  Eyes - Extraocular movements intact.  Sclera were not icteric or injected, conjunctiva were pink and moist.  Mouth - Examination of the oral cavity showed pink, healthy oral mucosa. No lesions or ulcerations noted.  The tongue was mobile and midline, and the dentition were in good condition.    Throat - The walls of the oropharynx were smooth, pink, moist, symmetric, and had no lesions or ulcerations.  The tonsillar pillars and soft palate were symmetric.  The uvula was midline on elevation.  Neck - Normal midline excursion of the laryngotracheal complex during swallowing.  Full range of motion on passive movement.  Palpation of the occipital, submental, submandibular, internal jugular chain, and supraclavicular nodes did not demonstrate any abnormal lymph nodes or masses.  The carotid pulse was palpable bilaterally.  Palpation of the thyroid was soft and smooth, with no nodules or goiter appreciated.  The trachea was mobile and midline.  Nose - External contour is symmetric, no gross deflection or scars.  Nasal mucosa is pink and moist with no abnormal mucus.  The septum was midline and non-obstructive, turbinates of normal size and position.  No polyps, masses, or purulence noted on examination.          Assessment - Camilo Young is a 84 year old male with right serous otitis media. I recommended autoinsufflation for the next 4 weeks. Return with audiogram at that time and if the effusion is still an issue I can do a nasopharyngoscopy and a myringotomy.     Patient to follow up with Primary Care provider regarding elevated blood pressure.    Dr. Jess Smith MD  Otolaryngology  [facility]        Again, thank you for allowing me to participate in the care of your patient.        Sincerely,        Jess Smith MD

## 2018-05-03 NOTE — MR AVS SNAPSHOT
"              After Visit Summary   5/3/2018    Camilo Young    MRN: [id number]           Patient Information     Date Of Birth          3/2/1934        Visit Information        Provider Department      5/3/2018 9:00 AM [facility]limp[facility]        Today's Diagnoses     ERRONEOUS ENCOUNTER--DISREGARD    -  1       Follow-ups after your visit        Who to contact     If you have questions or need follow up information about today's clinic visit or your schedule please contact [facility] directly at [phone].  Normal or non-critical lab and imaging results will be communicated to you by EDUonGohart, letter or phone within 4 business days after the clinic has received the results. If you do not hear from us within 7 days, please contact the clinic through EDUonGohart or phone. If you have a critical or abnormal lab result, we will notify you by phone as soon as possible.  Submit refill requests through Fabule or call your pharmacy and they will forward the refill request to us. Please allow 3 business days for your refill to be completed.          Additional Information About Your Visit        MyChart Information     Fabule lets you send messages to your doctor, view your test results, renew your prescriptions, schedule appointments and more. To sign up, go to [email or web address] . Click on \"Log in\" on the left side of the screen, which will take you to the Welcome page. Then click on \"Sign up Now\" on the right side of the page.     You will be asked to enter the access code listed below, as well as some personal information. Please follow the directions to create your username and password.     Your access code is: [email or web address]  Expires: 2018 11:26 AM     Your access code will  in 90 days. If you need help or a new code, please call your [facility] or [phone].        Care EveryWhere ID     This is your Care EveryWhere ID. This could be used by other " organizations to access your [facility] medical records  [id number]         Blood Pressure from Last 3 Encounters:   05/03/18 160/83   02/01/18 138/74   10/03/17 136/73    Weight from Last 3 Encounters:   05/03/18 206 lb 12.8 oz (93.8 kg)   02/01/18 222 lb (100.7 kg)   10/03/17 211 lb (95.7 kg)              Today, you had the following     No orders found for display       Primary Care Provider Office Phone # Fax #    Aubrey Delgado PA-C [phone] [address]  [place] [address]        Equal Access to Services     TYE VALENCIA : Hadii aneesh li hadasho Soomaali, waaxda luqadaha, qaybta kaalmada adeegyada, tray machado . So [facility] [phone].    ATENCIÓN: Si habla español, tiene a wright disposición servicios gratuitos de asistencia lingüística. Llame al [phone].    We comply with applicable federal civil rights laws and [place] laws. We do not discriminate on the basis of race, color, national origin, age, disability, sex, sexual orientation, or gender identity.            Thank you!     Thank you for choosing [facility]  for your care. Our goal is always to provide you with excellent care. Hearing back from our patients is one way we can continue to improve our services. Please take a few minutes to complete the written survey that you may receive in the mail after your visit with us. Thank you!             Your Updated Medication List - Protect others around you: Learn how to safely use, store and throw away your medicines at www.disposemymeds.org.          This list is accurate as of 5/3/18 11:26 AM.  Always use your most recent med list.                   Brand Name Dispense Instructions for use Diagnosis    amLODIPine 10 MG tablet    NORVASC    90 tablet    Take 1 tablet (10 mg) by mouth daily    Essential hypertension with goal blood pressure less than 140/90       * aspirin 81 MG tablet      Take 81 mg by mouth daily        * aspirin 81  MG EC tablet     90 tablet    Take 1 tablet (81 mg) by mouth daily    Chronic atrial fibrillation (H), Type 2 diabetes mellitus with stage 3 chronic kidney disease, without long-term current use of insulin (H)       blood glucose monitoring lancets     1 Box    1 each 3 times daily    Type 2 diabetes, HbA1c goal < 7% (H)       blood glucose monitoring test strip    ACCU-CHEK SMARTVIEW    3 Month    1 strip by In Vitro route 3 times daily Or as recommended by provider    Diabetes mellitus type 2, uncomplicated (H)       budesonide-formoterol 160-4.5 MCG/ACT Inhaler    SYMBICORT    3 Inhaler    Inhale 2 puffs into the lungs 2 times daily    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)       glipiZIDE 10 MG 24 hr tablet    GLUCOTROL XL    180 tablet    Take 2 tablets (20 mg) by mouth daily    Type 2 diabetes mellitus with stage 3 chronic kidney disease, without long-term current use of insulin (H)       losartan 25 MG tablet    COZAAR    90 tablet    Take 1 tablet (25 mg) by mouth daily    Hypertension goal BP (blood pressure) < 140/90       metFORMIN 1000 MG tablet    GLUCOPHAGE    180 tablet    Take 1 tablet (1,000 mg) by mouth 2 times daily (with meals)    Type 2 diabetes mellitus with stage 3 chronic kidney disease, without long-term current use of insulin (H)       order for DME     1 each    Glucometer, brand as covered by insurance.    Type 2 diabetes mellitus with stage 3 chronic kidney disease, without long-term current use of insulin (H)       simvastatin 40 MG tablet    ZOCOR    90 tablet    Take 1 tablet (40 mg) by mouth At Bedtime    Hyperlipidemia LDL goal <70       * Notice:  This list has 2 medication(s) that are the same as other medications prescribed for you. Read the directions carefully, and ask your doctor or other care provider to review them with you.

## 2018-05-21 ENCOUNTER — OFFICE VISIT (OUTPATIENT)
Dept: FAMILY MEDICINE | Facility: CLINIC | Age: 83
End: 2018-05-21
Payer: COMMERCIAL

## 2018-05-21 VITALS
SYSTOLIC BLOOD PRESSURE: 126 MMHG | OXYGEN SATURATION: 97 % | HEART RATE: 52 BPM | DIASTOLIC BLOOD PRESSURE: 65 MMHG | RESPIRATION RATE: 18 BRPM | WEIGHT: 210 LBS | HEIGHT: 68 IN | TEMPERATURE: 98 F | BODY MASS INDEX: 31.83 KG/M2

## 2018-05-21 DIAGNOSIS — E78.5 HYPERLIPIDEMIA LDL GOAL <70: ICD-10-CM

## 2018-05-21 DIAGNOSIS — E11.22 TYPE 2 DIABETES MELLITUS WITH STAGE 3 CHRONIC KIDNEY DISEASE, WITHOUT LONG-TERM CURRENT USE OF INSULIN (H): ICD-10-CM

## 2018-05-21 DIAGNOSIS — E11.42 TYPE 2 DIABETES MELLITUS WITH DIABETIC POLYNEUROPATHY, WITHOUT LONG-TERM CURRENT USE OF INSULIN (H): Primary | ICD-10-CM

## 2018-05-21 DIAGNOSIS — I10 ESSENTIAL HYPERTENSION WITH GOAL BLOOD PRESSURE LESS THAN 140/90: ICD-10-CM

## 2018-05-21 DIAGNOSIS — N18.30 TYPE 2 DIABETES MELLITUS WITH STAGE 3 CHRONIC KIDNEY DISEASE, WITHOUT LONG-TERM CURRENT USE OF INSULIN (H): ICD-10-CM

## 2018-05-21 DIAGNOSIS — I10 HYPERTENSION GOAL BP (BLOOD PRESSURE) < 140/90: ICD-10-CM

## 2018-05-21 DIAGNOSIS — J41.8 MIXED SIMPLE AND MUCOPURULENT CHRONIC BRONCHITIS (H): ICD-10-CM

## 2018-05-21 PROCEDURE — 99214 OFFICE O/P EST MOD 30 MIN: CPT | Performed by: PHYSICIAN ASSISTANT

## 2018-05-21 RX ORDER — LOSARTAN POTASSIUM 25 MG/1
25 TABLET ORAL DAILY
Qty: 90 TABLET | Refills: 1 | Status: SHIPPED | OUTPATIENT
Start: 2018-05-21 | End: 2018-09-21

## 2018-05-21 RX ORDER — BUDESONIDE AND FORMOTEROL FUMARATE DIHYDRATE 160; 4.5 UG/1; UG/1
2 AEROSOL RESPIRATORY (INHALATION) 2 TIMES DAILY
Qty: 3 INHALER | Refills: 3 | Status: SHIPPED | OUTPATIENT
Start: 2018-05-21 | End: 2018-09-21

## 2018-05-21 RX ORDER — SIMVASTATIN 40 MG
40 TABLET ORAL AT BEDTIME
Qty: 90 TABLET | Refills: 1 | Status: SHIPPED | OUTPATIENT
Start: 2018-05-21 | End: 2018-09-21

## 2018-05-21 RX ORDER — AMLODIPINE BESYLATE 10 MG/1
10 TABLET ORAL DAILY
Qty: 90 TABLET | Refills: 1 | Status: SHIPPED | OUTPATIENT
Start: 2018-05-21 | End: 2018-09-21

## 2018-05-21 RX ORDER — GLIPIZIDE 10 MG/1
20 TABLET, FILM COATED, EXTENDED RELEASE ORAL DAILY
Qty: 180 TABLET | Refills: 0 | Status: SHIPPED | OUTPATIENT
Start: 2018-05-21 | End: 2018-06-12

## 2018-05-21 NOTE — MR AVS SNAPSHOT
"              After Visit Summary   5/21/2018    Camilo Young    MRN: [id number]           Patient Information     Date Of Birth          3/2/1934        Visit Information        Provider Department      5/21/2018 9:20 AM Aubrey Delgado PA-C [facility] [place]        Today's Diagnoses     Type 2 diabetes mellitus with diabetic polyneuropathy, without long-term current use of insulin (H)    -  1    Essential hypertension with goal blood pressure less than 140/90        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)        Type 2 diabetes mellitus with stage 3 chronic kidney disease, without long-term current use of insulin (H)        Hypertension goal BP (blood pressure) < 140/90        Hyperlipidemia LDL goal <70           Follow-ups after your visit        Who to contact     Normal or non-critical lab and imaging results will be communicated to you by FoodyDirecthart, letter or phone within 4 business days after the clinic has received the results. If you do not hear from us within 7 days, please contact the clinic through FoodyDirecthart or phone. If you have a critical or abnormal lab result, we will notify you by phone as soon as possible.  Submit refill requests through Synchrony or call your pharmacy and they will forward the refill request to us. Please allow 3 business days for your refill to be completed.          If you need to speak with a  for additional information , please call: [phone]             Additional Information About Your Visit        Synchrony Information     Synchrony lets you send messages to your doctor, view your test results, renew your prescriptions, schedule appointments and more. To sign up, go to [email or web address] . Click on \"Log in\" on the left side of the screen, which will take you to the Welcome page. Then click on \"Sign up Now\" on the right side of the page.     You will be asked to enter the access code listed below, as well as some personal information. " "Please follow the directions to create your username and password.     Your access code is: [email or web address]  Expires: 2018 11:26 AM     Your access code will  in 90 days. If you need help or a new code, please call your [facility] clinic or [phone].        Care EveryWhere ID     This is your Care EveryWhere ID. This could be used by other organizations to access your [facility] medical records  [id number]        Your Vitals Were     Pulse Temperature Respirations Height Pulse Oximetry BMI (Body Mass Index)    52 98  F (36.7  C) (Tympanic) 18 5' 8\" (1.727 m) 97% 31.93 kg/m2       Blood Pressure from Last 3 Encounters:   18 126/65   18 160/83   18 138/74    Weight from Last 3 Encounters:   18 210 lb (95.3 kg)   18 206 lb 12.8 oz (93.8 kg)   18 222 lb (100.7 kg)              Today, you had the following     No orders found for display         Where to get your medicines      These medications were sent to [id number] - [place], [place] - [address] [address]  [address] [place] [address]     Phone:  [phone]     amLODIPine 10 MG tablet    glipiZIDE 10 MG 24 hr tablet    losartan 25 MG tablet    metFORMIN 1000 MG tablet    simvastatin 40 MG tablet         Some of these will need a paper prescription and others can be bought over the counter.  Ask your nurse if you have questions.     Bring a paper prescription for each of these medications     budesonide-formoterol 160-4.5 MCG/ACT Inhaler          Primary Care Provider Office Phone # Fax #    Aubrey Delgado PA-C [phone] [address] [address]        Equal Access to Services     DAXA VALENCIA : Michael Jeronimo, wamargareth shelton, qaybta kaalmatray dodson. [facility] [phone].    ATENCIÓN: Si habla español, tiene a wright disposición servicios gratuitos de asistencia lingüística. Llame al [phone].    We " comply with applicable federal civil rights laws and [place] laws. We do not discriminate on the basis of race, color, national origin, age, disability, sex, sexual orientation, or gender identity.            Thank you!     Thank you for choosing [facility]  for your care. Our goal is always to provide you with excellent care. Hearing back from our patients is one way we can continue to improve our services. Please take a few minutes to complete the written survey that you may receive in the mail after your visit with us. Thank you!             Your Updated Medication List - Protect others around you: Learn how to safely use, store and throw away your medicines at www.disposemymeds.org.          This list is accurate as of 5/21/18  9:50 AM.  Always use your most recent med list.                   Brand Name Dispense Instructions for use Diagnosis    amLODIPine 10 MG tablet    NORVASC    90 tablet    Take 1 tablet (10 mg) by mouth daily    Essential hypertension with goal blood pressure less than 140/90       * aspirin 81 MG tablet      Take 81 mg by mouth daily        * aspirin 81 MG EC tablet     90 tablet    Take 1 tablet (81 mg) by mouth daily    Chronic atrial fibrillation (H), Type 2 diabetes mellitus with stage 3 chronic kidney disease, without long-term current use of insulin (H)       blood glucose monitoring lancets     1 Box    1 each 3 times daily    Type 2 diabetes, HbA1c goal < 7% (H)       blood glucose monitoring test strip    ACCU-CHEK SMARTVIEW    3 Month    1 strip by In Vitro route 3 times daily Or as recommended by provider    Diabetes mellitus type 2, uncomplicated (H)       budesonide-formoterol 160-4.5 MCG/ACT Inhaler    SYMBICORT    3 Inhaler    Inhale 2 puffs into the lungs 2 times daily    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)       glipiZIDE 10 MG 24 hr tablet    GLUCOTROL XL    180 tablet    Take 2 tablets (20 mg) by mouth daily    Type 2 diabetes mellitus  with stage 3 chronic kidney disease, without long-term current use of insulin (H)       losartan 25 MG tablet    COZAAR    90 tablet    Take 1 tablet (25 mg) by mouth daily    Hypertension goal BP (blood pressure) < 140/90       metFORMIN 1000 MG tablet    GLUCOPHAGE    180 tablet    Take 1 tablet (1,000 mg) by mouth 2 times daily (with meals)    Type 2 diabetes mellitus with stage 3 chronic kidney disease, without long-term current use of insulin (H)       order for DME     1 each    Glucometer, brand as covered by insurance.    Type 2 diabetes mellitus with stage 3 chronic kidney disease, without long-term current use of insulin (H)       simvastatin 40 MG tablet    ZOCOR    90 tablet    Take 1 tablet (40 mg) by mouth At Bedtime    Hyperlipidemia LDL goal <70       * Notice:  This list has 2 medication(s) that are the same as other medications prescribed for you. Read the directions carefully, and ask your doctor or other care provider to review them with you.

## 2018-05-21 NOTE — PROGRESS NOTES
SUBJECTIVE:   Camilo Young is a 84 year old male who presents to clinic today for the following health issues:      Diabetes Follow-up    Patient is checking blood sugars: once daily.  Results are as follows:         am - 100    Diabetic concerns: None     Symptoms of hypoglycemia (low blood sugar): none     Paresthesias (numbness or burning in feet) or sores: No     Date of last diabetic eye exam: 2017    BP Readings from Last 2 Encounters:   05/21/18 126/65   05/03/18 160/83     Hemoglobin A1C (%)   Date Value   04/24/2018 7.9 (H)   02/01/2018 8.8 (H)     LDL Cholesterol Calculated (mg/dL)   Date Value   04/24/2018 51   03/17/2017 75       Amount of exercise or physical activity: 4-5 days/week for an average of 15-30 minutes    Problems taking medications regularly: No    Medication side effects: none    Diet: regular (no restrictions)            Problem list and histories reviewed & adjusted, as indicated.  Additional history: as documented    BP Readings from Last 3 Encounters:   05/21/18 126/65   05/03/18 160/83   02/01/18 138/74    Wt Readings from Last 3 Encounters:   05/21/18 210 lb (95.3 kg)   05/03/18 206 lb 12.8 oz (93.8 kg)   02/01/18 222 lb (100.7 kg)                    Reviewed and updated as needed this visit by clinical staff  Tobacco  Allergies  Meds  Problems  Med Hx  Surg Hx  Fam Hx  Soc Hx        Reviewed and updated as needed this visit by Provider  Tobacco  Allergies  Meds  Problems  Med Hx  Surg Hx  Fam Hx  Soc Hx          All other systems negative except as outline above  OBJECTIVE:  Eye exam - right eye abnormal findings: cataract noted, left eye abnormal findings: cataract noted.  Thyroid not palpable, not enlarged, no nodules detected.  CHEST:no tachypnea, retractions or cyanosis, air entry reduced both lower lobes and Heart exam: 1/6 LIZETTE is heard at 2nd left intercostal space, S1 and S2 normal, regular rate and rhythm.  Examination of the feet reveals no trophic changes  or ulcerative lesions, DP reduced bilateral , PT reduced bilateral and reduced sensation at plantar aspect of both feet.  Camilo was seen today for diabetes.    Diagnoses and all orders for this visit:    Type 2 diabetes mellitus with diabetic polyneuropathy, without long-term current use of insulin (H)    Essential hypertension with goal blood pressure less than 140/90  -     amLODIPine (NORVASC) 10 MG tablet; Take 1 tablet (10 mg) by mouth daily    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)  -     budesonide-formoterol (SYMBICORT) 160-4.5 MCG/ACT Inhaler; Inhale 2 puffs into the lungs 2 times daily    Type 2 diabetes mellitus with stage 3 chronic kidney disease, without long-term current use of insulin (H)  -     glipiZIDE (GLUCOTROL XL) 10 MG 24 hr tablet; Take 2 tablets (20 mg) by mouth daily  -     metFORMIN (GLUCOPHAGE) 1000 MG tablet; Take 1 tablet (1,000 mg) by mouth 2 times daily (with meals)    Hypertension goal BP (blood pressure) < 140/90  -     losartan (COZAAR) 25 MG tablet; Take 1 tablet (25 mg) by mouth daily    Hyperlipidemia LDL goal <70  -     simvastatin (ZOCOR) 40 MG tablet; Take 1 tablet (40 mg) by mouth At Bedtime      work on lifestyle modification  Recheck in 3 mos

## 2018-05-30 ENCOUNTER — TELEPHONE (OUTPATIENT)
Dept: FAMILY MEDICINE | Facility: CLINIC | Age: 83
End: 2018-05-30

## 2018-05-30 NOTE — TELEPHONE ENCOUNTER
Patient want to know if Symbicort is wrong as it use to be 1 puff twice daily.  Not 2 puffs twice daily. Please advise.  Thank You.

## 2018-05-30 NOTE — TELEPHONE ENCOUNTER
"Spoke with patient and per chart directions have been 2 puffs BID and with previous scripts.  He reports he has only been taking 1 puff BID as long as he can remember.   this dose works \"great\" he says.  He also is worried because he says the commercials say no more that 2 puffs a day?  Patient wanted to verify with Aubrey what the directions should be? And if 1 puff BID works well would he still have to increase if he is dosing wrong?  Radha Mendez RN        "

## 2018-06-06 DIAGNOSIS — E11.22 TYPE 2 DIABETES MELLITUS WITH STAGE 3 CHRONIC KIDNEY DISEASE, WITHOUT LONG-TERM CURRENT USE OF INSULIN (H): ICD-10-CM

## 2018-06-06 DIAGNOSIS — N18.30 TYPE 2 DIABETES MELLITUS WITH STAGE 3 CHRONIC KIDNEY DISEASE, WITHOUT LONG-TERM CURRENT USE OF INSULIN (H): ICD-10-CM

## 2018-06-06 RX ORDER — GLIPIZIDE 10 MG/1
20 TABLET, FILM COATED, EXTENDED RELEASE ORAL DAILY
Qty: 180 TABLET | Refills: 0 | OUTPATIENT
Start: 2018-06-06

## 2018-06-06 NOTE — TELEPHONE ENCOUNTER
"Requested Prescriptions   Pending Prescriptions Disp Refills     glipiZIDE (GLUCOTROL XL) 10 MG 24 hr tablet 180 tablet 0    Last Written Prescription Date:  5/21/18  Last Fill Quantity: 180,  # refills: 0   Last office visit: 5/21/2018 with prescribing provider:  5/21/18 EFREM Delgado   Future Office Visit:   Sig: Take 2 tablets (20 mg) by mouth daily    Sulfonylurea Agents Passed    6/6/2018  4:29 PM       Passed - Blood pressure less than 140/90 in past 6 months    BP Readings from Last 3 Encounters:   05/21/18 126/65   05/03/18 160/83   02/01/18 138/74                Passed - Patient has documented LDL within the past 12 mos.    Recent Labs   Lab Test  04/24/18   0934   LDL  51            Passed - Patient has had a Microalbumin in the past 12 mos.    Recent Labs   Lab Test  10/03/17   1111   MICROL  107   UMALCR  67.30*            Passed - Patient has documented A1c within the specified period of time.    If HgbA1C is 8 or greater, it needs to be on file within the past 3 months.  If less than 8, must be on file within the past 6 months.     Recent Labs   Lab Test  04/24/18   0934   A1C  7.9*            Passed - Patient is age 18 or older       Passed - Patient has a recent creatinine (normal) within the past 12 mos.    Recent Labs   Lab Test  04/24/18   0934   CR  1.11            Passed - Recent (6 mo) or future (30 days) visit within the authorizing provider's specialty    Patient had office visit in the last 6 months or has a visit in the next 30 days with authorizing provider or within the authorizing provider's specialty.  See \"Patient Info\" tab in inbasket, or \"Choose Columns\" in Meds & Orders section of the refill encounter.            "

## 2018-06-12 DIAGNOSIS — E11.22 TYPE 2 DIABETES MELLITUS WITH STAGE 3 CHRONIC KIDNEY DISEASE, WITHOUT LONG-TERM CURRENT USE OF INSULIN (H): ICD-10-CM

## 2018-06-12 DIAGNOSIS — N18.30 TYPE 2 DIABETES MELLITUS WITH STAGE 3 CHRONIC KIDNEY DISEASE, WITHOUT LONG-TERM CURRENT USE OF INSULIN (H): ICD-10-CM

## 2018-06-13 RX ORDER — GLIPIZIDE 10 MG/1
TABLET, FILM COATED, EXTENDED RELEASE ORAL
Qty: 180 TABLET | Refills: 0 | Status: SHIPPED | OUTPATIENT
Start: 2018-06-13 | End: 2018-09-21

## 2018-06-13 NOTE — TELEPHONE ENCOUNTER
Prescription approved per [facility] Refill Protocol.  Carley Klein RN

## 2018-06-13 NOTE — TELEPHONE ENCOUNTER
"Requested Prescriptions   Pending Prescriptions Disp Refills     glipiZIDE (GLUCOTROL XL) 10 MG 24 hr tablet [Pharmacy Med Name: GlipiZIDE ER Oral Tablet Extended Release 24 Hour 10 MG] 180 tablet 0    Last Written Prescription Date:  05/21/18  Last Fill Quantity: 180,  # refills: 0   Last office visit: 5/21/2018 with prescribing provider:  BRENNON Delgado Future Office Visit:     Sig: Take 2 tablets (20 mg) by mouth daily    Sulfonylurea Agents Passed    6/12/2018  7:40 PM       Passed - Blood pressure less than 140/90 in past 6 months    BP Readings from Last 3 Encounters:   05/21/18 126/65   05/03/18 160/83   02/01/18 138/74                Passed - Patient has documented LDL within the past 12 mos.    Recent Labs   Lab Test  04/24/18   0934   LDL  51            Passed - Patient has had a Microalbumin in the past 12 mos.    Recent Labs   Lab Test  10/03/17   1111   MICROL  107   UMALCR  67.30*            Passed - Patient has documented A1c within the specified period of time.    If HgbA1C is 8 or greater, it needs to be on file within the past 3 months.  If less than 8, must be on file within the past 6 months.     Recent Labs   Lab Test  04/24/18   0934   A1C  7.9*            Passed - Patient is age 18 or older       Passed - Patient has a recent creatinine (normal) within the past 12 mos.    Recent Labs   Lab Test  04/24/18   0934   CR  1.11            Passed - Recent (6 mo) or future (30 days) visit within the authorizing provider's specialty    Patient had office visit in the last 6 months or has a visit in the next 30 days with authorizing provider or within the authorizing provider's specialty.  See \"Patient Info\" tab in inbasket, or \"Choose Columns\" in Meds & Orders section of the refill encounter.              "

## 2018-09-10 ENCOUNTER — OFFICE VISIT (OUTPATIENT)
Dept: OPHTHALMOLOGY | Facility: CLINIC | Age: 83
End: 2018-09-10
Payer: COMMERCIAL

## 2018-09-10 ENCOUNTER — TELEPHONE (OUTPATIENT)
Dept: OPHTHALMOLOGY | Facility: CLINIC | Age: 83
End: 2018-09-10

## 2018-09-10 DIAGNOSIS — E11.42 TYPE 2 DIABETES MELLITUS WITH DIABETIC POLYNEUROPATHY, WITHOUT LONG-TERM CURRENT USE OF INSULIN (H): Primary | ICD-10-CM

## 2018-09-10 DIAGNOSIS — H25.13 NUCLEAR SCLEROSIS OF BOTH EYES: ICD-10-CM

## 2018-09-10 DIAGNOSIS — H52.203 ASTIGMATISM OF BOTH EYES, UNSPECIFIED TYPE: ICD-10-CM

## 2018-09-10 DIAGNOSIS — H52.01 HYPERMETROPIA OF RIGHT EYE: ICD-10-CM

## 2018-09-10 DIAGNOSIS — H52.12 MYOPIA OF LEFT EYE: ICD-10-CM

## 2018-09-10 DIAGNOSIS — H52.4 PRESBYOPIA: ICD-10-CM

## 2018-09-10 DIAGNOSIS — H35.89 RPE MOTTLING OF MACULA: ICD-10-CM

## 2018-09-10 DIAGNOSIS — H43.811 POSTERIOR VITREOUS DETACHMENT OF RIGHT EYE: ICD-10-CM

## 2018-09-10 PROCEDURE — 92015 DETERMINE REFRACTIVE STATE: CPT | Performed by: STUDENT IN AN ORGANIZED HEALTH CARE EDUCATION/TRAINING PROGRAM

## 2018-09-10 PROCEDURE — 92004 COMPRE OPH EXAM NEW PT 1/>: CPT | Performed by: STUDENT IN AN ORGANIZED HEALTH CARE EDUCATION/TRAINING PROGRAM

## 2018-09-10 ASSESSMENT — VISUAL ACUITY
OS_CC: >12
OD_CC: 20/60
CORRECTION_TYPE: GLASSES
OD_PH_CC: 50-1
METHOD: SNELLEN - LINEAR
OS_PH_CC: 20/50
OD_CC: 12
OS_CC: 20/125

## 2018-09-10 ASSESSMENT — CONF VISUAL FIELD
OD_NORMAL: 1
OS_NORMAL: 1

## 2018-09-10 ASSESSMENT — TONOMETRY
IOP_METHOD: APPLANATION
OD_IOP_MMHG: 18
OS_IOP_MMHG: 17

## 2018-09-10 ASSESSMENT — REFRACTION_WEARINGRX
OS_SPHERE: +1.75
OD_SPHERE: +1.25
OS_CYLINDER: +2.50
OD_AXIS: 011
OS_ADD: +2.50
OS_AXIS: 163
SPECS_TYPE: PAL
OD_CYLINDER: +2.25
OD_ADD: +2.50

## 2018-09-10 ASSESSMENT — SLIT LAMP EXAM - LIDS
COMMENTS: 2+ DERMATOCHALASIS - UPPER LID
COMMENTS: 2+ DERMATOCHALASIS - UPPER LID

## 2018-09-10 ASSESSMENT — REFRACTION_MANIFEST
OS_AXIS: 167
OD_AXIS: 030
OS_CYLINDER: +2.75
OS_SPHERE: -0.50
OD_ADD: +3.00
OD_SPHERE: +1.25
OS_ADD: +3.00
OD_CYLINDER: +2.75

## 2018-09-10 ASSESSMENT — CUP TO DISC RATIO
OS_RATIO: 0.1
OD_RATIO: 0.1

## 2018-09-10 ASSESSMENT — EXTERNAL EXAM - LEFT EYE: OS_EXAM: PROLAPSED FAT PADS: UPPER, LOWER

## 2018-09-10 ASSESSMENT — EXTERNAL EXAM - RIGHT EYE: OD_EXAM: PROLAPSED FAT PADS: UPPER, LOWER

## 2018-09-10 NOTE — PROGRESS NOTES
Current Eye Medications:  No eye drops or eye vitamins.       Subjective:  Comprehensive Eye Exam.  Patient is here for a Diabetic Eye Exam.  He is unable to read the newspaper unless he uses a LED flashlight.  He is able to see to drive without any difficulty.    Last eye exam:  2013, with Dr. Corral, but he possibly had a more recent eye exam at [facility].      Lab Results   Component Value Date    A1C 7.9 04/24/2018    A1C 8.8 02/01/2018    A1C 8.1 10/03/2017    A1C 7.4 03/17/2017    A1C 7.6 09/20/2016      Objective:  See Ophthalmology Exam.       Assessment:   Encounter Diagnoses   Name Primary?     Type 2 diabetes mellitus with diabetic polyneuropathy, without long-term current use of insulin (H) Negative diabetic retinopathy      Nuclear sclerosis of both eyes Visually significant left>right. Dil 6, ok for FSH or MG, no CA3 discussion.     Posterior vitreous detachment of right eye      RPE mottling of macula OU      Visually significant cataract that is interfering with daily activities of living. Plan for cataract extraction and intraocular lens implant left eye.  Risks, benefits, complications, and alternatives discussed with patient including possibility of limitations from coexistent eye disease and loss of vision. Target refraction and lens options discussed.  Patient understands and wishes to proceed with surgery.        Plan:  Offered cataract surgery left eye then right eye at anytime. Call Saqib GARZA @ [phone] to schedule.  Keep blood sugars and blood pressure under good control.    Claire Barrios MD  [phone]

## 2018-09-10 NOTE — PATIENT INSTRUCTIONS
Offered cataract surgery left eye then right eye at anytime. Call Saqib KAYLA @ [phone] to schedule.  Keep blood sugars and blood pressure under good control.    Claire Barrios MD  [phone]    Diabetes weakens the blood vessels all over the body, including the eyes. Damage to the blood vessels in the eyes can cause swelling or bleeding into part of the eye (called the retina). This is called diabetic retinopathy (JAQUI-tin--pu-thee). If not treated, this disease can cause vision loss or blindness.   Symptoms may include blurred or distorted vision, but many people have no symptoms. It's important to see your eye doctor regularly to check for problems.   Early treatment and good control can help protect your vision. Here are the things you can do to help prevent vision loss:      1. Keep your blood sugar levels under tight control.      2. Bring high blood pressure under control.      3. No smoking.      4. Have yearly dilated eye exams.

## 2018-09-10 NOTE — TELEPHONE ENCOUNTER
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 Barrios  Pre-Op Appt Date: 09/20/2018  Post-Op Appt Date: 10/02/2018   Packet sent out: Yes  Pre-cert/Authorization completed:  No pre cert needed  Date: 09/10/2018

## 2018-09-10 NOTE — MR AVS SNAPSHOT
After Visit Summary   9/10/2018    Camilo Young    MRN: [id number]           Patient Information     Date Of Birth          3/2/1934        Visit Information        Provider Department      9/10/2018 2:00 PM Claire Barrios MD [facility]        Today's Diagnoses     Type 2 diabetes mellitus with diabetic polyneuropathy, without long-term current use of insulin (H)    -  1    Nuclear sclerosis of both eyes        Presbyopia        Hypermetropia of right eye        Astigmatism of both eyes, unspecified type        Myopia of left eye          Care Instructions    Offered cataract surgery left eye then right eye at anytime. Call Saqib GARZA @ [phone] to schedule.  Keep blood sugars and blood pressure under good control.    Claire Barrios MD  [phone]    Diabetes weakens the blood vessels all over the body, including the eyes. Damage to the blood vessels in the eyes can cause swelling or bleeding into part of the eye (called the retina). This is called diabetic retinopathy (JAQUI-tin--pu-thee). If not treated, this disease can cause vision loss or blindness.   Symptoms may include blurred or distorted vision, but many people have no symptoms. It's important to see your eye doctor regularly to check for problems.   Early treatment and good control can help protect your vision. Here are the things you can do to help prevent vision loss:      1. Keep your blood sugar levels under tight control.      2. Bring high blood pressure under control.      3. No smoking.      4. Have yearly dilated eye exams.            Follow-ups after your visit        Follow-up notes from your care team     Return in about 1 year (around 9/10/2019) for Complete Exam.      Who to contact     If you have questions or need follow up information about today's clinic visit or your schedule please contact [facility] directly at [phone].  Normal or non-critical lab and imaging results will  be communicated to you by MyChart, letter or phone within 4 business days after the clinic has received the results. If you do not hear from us within 7 days, please contact the clinic through MyChart or phone. If you have a critical or abnormal lab result, we will notify you by phone as soon as possible.  Submit refill requests through [employer] or call your pharmacy and they will forward the refill request to us. Please allow 3 business days for your refill to be completed.          Additional Information About Your Visit        Care EveryWhere ID     This is your Care EveryWhere ID. This could be used by other organizations to access your [place] medical records  [id number]         Blood Pressure from Last 3 Encounters:   05/21/18 126/65   05/03/18 160/83   02/01/18 138/74    Weight from Last 3 Encounters:   05/21/18 95.3 kg (210 lb)   05/03/18 93.8 kg (206 lb 12.8 oz)   02/01/18 100.7 kg (222 lb)              We Performed the Following     EYE EXAM (SIMPLE-NONBILLABLE)     REFRACTIVE STATUS        Primary Care Provider Office Phone # Fax #    Aubrey Delgado PA-C [phone]       [address] [facility] [address]        Equal Access to Services     DAXA VALENCIA : Hadii aad ku hadasho Soomaali, waaxda luqadaha, qaybta kaalmada adeegyada, waxay idiin haydakotan linh machado . So [facility] [phone].    ATENCIÓN: Si habla español, tiene a wright disposición servicios gratuitos de asistencia lingüística. Llame al [phone].    We comply with applicable federal civil rights laws and [place] laws. We do not discriminate on the basis of race, color, national origin, age, disability, sex, sexual orientation, or gender identity.            Thank you!     Thank you for choosing [facility]  for your care. Our goal is always to provide you with excellent care. Hearing back from our patients is one way we can continue to improve our services. Please take a few minutes to complete the  written survey that you may receive in the mail after your visit with us. Thank you!             Your Updated Medication List - Protect others around you: Learn how to safely use, store and throw away your medicines at www.disposemymeds.org.          This list is accurate as of 9/10/18  2:54 PM.  Always use your most recent med list.                   Brand Name Dispense Instructions for use Diagnosis    amLODIPine 10 MG tablet    NORVASC    90 tablet    Take 1 tablet (10 mg) by mouth daily    Essential hypertension with goal blood pressure less than 140/90       * aspirin 81 MG tablet      Take 81 mg by mouth daily        * aspirin 81 MG EC tablet     90 tablet    Take 1 tablet (81 mg) by mouth daily    Chronic atrial fibrillation (H), Type 2 diabetes mellitus with stage 3 chronic kidney disease, without long-term current use of insulin (H)       blood glucose monitoring lancets     1 Box    1 each 3 times daily    Type 2 diabetes, HbA1c goal < 7% (H)       blood glucose monitoring test strip    ACCU-CHEK SMARTVIEW    3 Month    1 strip by In Vitro route 3 times daily Or as recommended by provider    Diabetes mellitus type 2, uncomplicated (H)       budesonide-formoterol 160-4.5 MCG/ACT Inhaler    SYMBICORT    3 Inhaler    Inhale 2 puffs into the lungs 2 times daily    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)       glipiZIDE 10 MG 24 hr tablet    GLUCOTROL XL    180 tablet    Take 2 tablets (20 mg) by mouth daily    Type 2 diabetes mellitus with stage 3 chronic kidney disease, without long-term current use of insulin (H)       losartan 25 MG tablet    COZAAR    90 tablet    Take 1 tablet (25 mg) by mouth daily    Hypertension goal BP (blood pressure) < 140/90       metFORMIN 1000 MG tablet    GLUCOPHAGE    180 tablet    Take 1 tablet (1,000 mg) by mouth 2 times daily (with meals)    Type 2 diabetes mellitus with stage 3 chronic kidney disease, without long-term current use of insulin (H)       order for  DME     1 each    Glucometer, brand as covered by insurance.    Type 2 diabetes mellitus with stage 3 chronic kidney disease, without long-term current use of insulin (H)       simvastatin 40 MG tablet    ZOCOR    90 tablet    Take 1 tablet (40 mg) by mouth At Bedtime    Hyperlipidemia LDL goal <70       * Notice:  This list has 2 medication(s) that are the same as other medications prescribed for you. Read the directions carefully, and ask your doctor or other care provider to review them with you.

## 2018-09-10 NOTE — LETTER
9/10/2018         RE: Camilo Young  [address] [place] [address]        Dear Colleague,    Thank you for referring your patient, Camilo Young, to the [facility].    No signs of diabetic retinopathy in either eye today.  Please see a copy of my visit note below.     Current Eye Medications:  No eye drops or eye vitamins.       Subjective:  Comprehensive Eye Exam.  Patient is here for a Diabetic Eye Exam.  He is unable to read the newspaper unless he uses a LED flashlight.  He is able to see to drive without any difficulty.    Last eye exam:  2013, with Dr. Corral, but he possibly had a more recent eye exam at [facility].      Lab Results   Component Value Date    A1C 7.9 04/24/2018    A1C 8.8 02/01/2018    A1C 8.1 10/03/2017    A1C 7.4 03/17/2017    A1C 7.6 09/20/2016      Objective:  See Ophthalmology Exam.       Assessment:   Encounter Diagnoses   Name Primary?     Type 2 diabetes mellitus with diabetic polyneuropathy, without long-term current use of insulin (H) Negative diabetic retinopathy      Nuclear sclerosis of both eyes Visually significant left>right. Dil 6, ok for FSH or MG, no CA3 discussion.     Posterior vitreous detachment of right eye      RPE mottling of macula OU      Visually significant cataract that is interfering with daily activities of living. Plan for cataract extraction and intraocular lens implant left eye.  Risks, benefits, complications, and alternatives discussed with patient including possibility of limitations from coexistent eye disease and loss of vision. Target refraction and lens options discussed.  Patient understands and wishes to proceed with surgery.        Plan:  Offered cataract surgery left eye then right eye at anytime. Call Saqib GARZA @ [phone] to schedule.  Keep blood sugars and blood pressure under good control.    Claire Barrios MD  [phone]      Again, thank you for allowing me to participate in the care of your patient.         Sincerely,        Claire Barrios MD

## 2018-09-21 ENCOUNTER — OFFICE VISIT (OUTPATIENT)
Dept: FAMILY MEDICINE | Facility: CLINIC | Age: 83
End: 2018-09-21
Payer: COMMERCIAL

## 2018-09-21 VITALS
SYSTOLIC BLOOD PRESSURE: 138 MMHG | BODY MASS INDEX: 32.74 KG/M2 | WEIGHT: 216 LBS | DIASTOLIC BLOOD PRESSURE: 70 MMHG | RESPIRATION RATE: 16 BRPM | HEART RATE: 79 BPM | TEMPERATURE: 98.1 F | HEIGHT: 68 IN | OXYGEN SATURATION: 96 %

## 2018-09-21 DIAGNOSIS — E78.5 HYPERLIPIDEMIA LDL GOAL <70: ICD-10-CM

## 2018-09-21 DIAGNOSIS — I10 HYPERTENSION GOAL BP (BLOOD PRESSURE) < 140/90: ICD-10-CM

## 2018-09-21 DIAGNOSIS — N18.30 TYPE 2 DIABETES MELLITUS WITH STAGE 3 CHRONIC KIDNEY DISEASE, WITHOUT LONG-TERM CURRENT USE OF INSULIN (H): Primary | ICD-10-CM

## 2018-09-21 DIAGNOSIS — E11.22 TYPE 2 DIABETES MELLITUS WITH STAGE 3 CHRONIC KIDNEY DISEASE, WITHOUT LONG-TERM CURRENT USE OF INSULIN (H): Primary | ICD-10-CM

## 2018-09-21 DIAGNOSIS — J41.8 MIXED SIMPLE AND MUCOPURULENT CHRONIC BRONCHITIS (H): ICD-10-CM

## 2018-09-21 DIAGNOSIS — I10 ESSENTIAL HYPERTENSION WITH GOAL BLOOD PRESSURE LESS THAN 140/90: ICD-10-CM

## 2018-09-21 LAB
CREAT UR-MCNC: 48 MG/DL
HBA1C MFR BLD: 8.5 % (ref 0–5.6)
MICROALBUMIN UR-MCNC: 28 MG/L
MICROALBUMIN/CREAT UR: 59.16 MG/G CR (ref 0–17)

## 2018-09-21 PROCEDURE — 82043 UR ALBUMIN QUANTITATIVE: CPT | Performed by: PHYSICIAN ASSISTANT

## 2018-09-21 PROCEDURE — 99214 OFFICE O/P EST MOD 30 MIN: CPT | Performed by: PHYSICIAN ASSISTANT

## 2018-09-21 PROCEDURE — 36415 COLL VENOUS BLD VENIPUNCTURE: CPT | Performed by: PHYSICIAN ASSISTANT

## 2018-09-21 PROCEDURE — 83036 HEMOGLOBIN GLYCOSYLATED A1C: CPT | Performed by: PHYSICIAN ASSISTANT

## 2018-09-21 RX ORDER — SIMVASTATIN 40 MG
40 TABLET ORAL AT BEDTIME
Qty: 90 TABLET | Refills: 1 | Status: SHIPPED | OUTPATIENT
Start: 2018-09-21 | End: 2019-07-09

## 2018-09-21 RX ORDER — LOSARTAN POTASSIUM 50 MG/1
50 TABLET ORAL DAILY
Qty: 90 TABLET | Refills: 1 | Status: SHIPPED | OUTPATIENT
Start: 2018-09-21 | End: 2019-07-23

## 2018-09-21 RX ORDER — BUDESONIDE AND FORMOTEROL FUMARATE DIHYDRATE 160; 4.5 UG/1; UG/1
2 AEROSOL RESPIRATORY (INHALATION) 2 TIMES DAILY
Qty: 3 INHALER | Refills: 3 | Status: SHIPPED | OUTPATIENT
Start: 2018-09-21 | End: 2018-12-24

## 2018-09-21 RX ORDER — AMLODIPINE BESYLATE 10 MG/1
10 TABLET ORAL DAILY
Qty: 90 TABLET | Refills: 1 | Status: SHIPPED | OUTPATIENT
Start: 2018-09-21 | End: 2019-07-31

## 2018-09-21 RX ORDER — GLIPIZIDE 10 MG/1
TABLET, FILM COATED, EXTENDED RELEASE ORAL
Qty: 180 TABLET | Refills: 1 | Status: SHIPPED | OUTPATIENT
Start: 2018-09-21 | End: 2019-06-17

## 2018-09-21 NOTE — MR AVS SNAPSHOT
"              After Visit Summary   9/21/2018    Camilo Young    MRN: [id number]           Patient Information     Date Of Birth          3/2/1934        Visit Information        Provider Department      9/21/2018 9:40 AM Aubery Delgado PA-C [facility] [place]        Today's Diagnoses     Type 2 diabetes mellitus with stage 3 chronic kidney disease, without long-term current use of insulin (H)    -  1    Essential hypertension with goal blood pressure less than 140/90        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)        Hypertension goal BP (blood pressure) < 140/90        Hyperlipidemia LDL goal <70           Follow-ups after your visit        Follow-up notes from your care team     Return in about 3 months (around 12/21/2018) for diabetes recheck.      Who to contact     Normal or non-critical lab and imaging results will be communicated to you by MyChart, letter or phone within 4 business days after the clinic has received the results. If you do not hear from us within 7 days, please contact the clinic through MyChart or phone. If you have a critical or abnormal lab result, we will notify you by phone as soon as possible.  Submit refill requests through [employer] or call your pharmacy and they will forward the refill request to us. Please allow 3 business days for your refill to be completed.          If you need to speak with a  for additional information , please call: [phone]             Additional Information About Your Visit        Care EveryWhere ID     This is your Care EveryWhere ID. This could be used by other organizations to access your [place] medical records  [id number]        Your Vitals Were     Pulse Temperature Respirations Height Pulse Oximetry BMI (Body Mass Index)    79 98.1  F (36.7  C) (Tympanic) 16 5' 8\" (1.727 m) 96% 32.84 kg/m2       Blood Pressure from Last 3 Encounters:   09/21/18 138/70   05/21/18 126/65   05/03/18 160/83    Weight from " Last 3 Encounters:   09/21/18 216 lb (98 kg)   05/21/18 210 lb (95.3 kg)   05/03/18 206 lb 12.8 oz (93.8 kg)              We Performed the Following     Albumin Random Urine Quantitative with Creat Ratio     COPD ACTION PLAN     Hemoglobin A1c     JUST IN CASE          Today's Medication Changes          These changes are accurate as of 9/21/18 10:09 AM.  If you have any questions, ask your nurse or doctor.               Start taking these medicines.        Dose/Directions    linagliptin 5 MG Tabs tablet   Commonly known as:  TRADJENTA   Used for:  Type 2 diabetes mellitus with stage 3 chronic kidney disease, without long-term current use of insulin (H)   Started by:  uAbrey Delgado PA-C        Dose:  5 mg   Take 1 tablet (5 mg) by mouth daily   Quantity:  90 tablet   Refills:  3         These medicines have changed or have updated prescriptions.        Dose/Directions    aspirin 81 MG EC tablet   This may have changed:  Another medication with the same name was removed. Continue taking this medication, and follow the directions you see here.   Used for:  Chronic atrial fibrillation (H), Type 2 diabetes mellitus with stage 3 chronic kidney disease, without long-term current use of insulin (H)   Changed by:  Aubrey Delgado PA-C        Dose:  81 mg   Take 1 tablet (81 mg) by mouth daily   Quantity:  90 tablet   Refills:  3       losartan 50 MG tablet   Commonly known as:  COZAAR   This may have changed:    - medication strength  - how much to take   Used for:  Hypertension goal BP (blood pressure) < 140/90   Changed by:  Aubrey Delgado PA-C        Dose:  50 mg   Take 1 tablet (50 mg) by mouth daily   Quantity:  90 tablet   Refills:  1            Where to get your medicines      These medications were sent to [id number] - [place] - [address] [address]     Phone:  [phone]     amLODIPine 10 MG tablet    budesonide-formoterol 160-4.5 MCG/ACT  Inhaler    glipiZIDE 10 MG 24 hr tablet    linagliptin 5 MG Tabs tablet    losartan 50 MG tablet    metFORMIN 1000 MG tablet    simvastatin 40 MG tablet                Primary Care Provider Office Phone # Fax #    Aubrey Delgado PA-C [phone]       [address]  [place] [address]        Equal Access to Services     [facility]: Hadii aad ku hadasho Soomaali, waaxda luqadaha, qaybta kaalmada adeegyada, waxay idiin hayaan adeeg kharash la'aan . So [facility] [phone].    ATENCIÓN: Si habla español, tiene a wright disposición servicios gratuitos de asistencia lingüística. [facility] [phone].    We comply with applicable federal civil rights laws and [place] laws. We do not discriminate on the basis of race, color, national origin, age, disability, sex, sexual orientation, or gender identity.            Thank you!     Thank you for choosing [facility]  for your care. Our goal is always to provide you with excellent care. Hearing back from our patients is one way we can continue to improve our services. Please take a few minutes to complete the written survey that you may receive in the mail after your visit with us. Thank you!             Your Updated Medication List - Protect others around you: Learn how to safely use, store and throw away your medicines at www.disposemymeds.org.          This list is accurate as of 9/21/18 10:09 AM.  Always use your most recent med list.                   Brand Name Dispense Instructions for use Diagnosis    amLODIPine 10 MG tablet    NORVASC    90 tablet    Take 1 tablet (10 mg) by mouth daily    Essential hypertension with goal blood pressure less than 140/90       aspirin 81 MG EC tablet     90 tablet    Take 1 tablet (81 mg) by mouth daily    Chronic atrial fibrillation (H), Type 2 diabetes mellitus with stage 3 chronic kidney disease, without long-term current use of insulin (H)       blood glucose monitoring lancets     1 Box    1 each 3  times daily    Type 2 diabetes, HbA1c goal < 7% (H)       blood glucose monitoring test strip    ACCU-CHEK SMARTVIEW    3 Month    1 strip by In Vitro route 3 times daily Or as recommended by provider    Diabetes mellitus type 2, uncomplicated (H)       budesonide-formoterol 160-4.5 MCG/ACT Inhaler    SYMBICORT    3 Inhaler    Inhale 2 puffs into the lungs 2 times daily    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)       glipiZIDE 10 MG 24 hr tablet    GLUCOTROL XL    180 tablet    Take 2 tablets (20 mg) by mouth daily    Type 2 diabetes mellitus with stage 3 chronic kidney disease, without long-term current use of insulin (H)       linagliptin 5 MG Tabs tablet    TRADJENTA    90 tablet    Take 1 tablet (5 mg) by mouth daily    Type 2 diabetes mellitus with stage 3 chronic kidney disease, without long-term current use of insulin (H)       losartan 50 MG tablet    COZAAR    90 tablet    Take 1 tablet (50 mg) by mouth daily    Hypertension goal BP (blood pressure) < 140/90       metFORMIN 1000 MG tablet    GLUCOPHAGE    180 tablet    Take 1 tablet (1,000 mg) by mouth 2 times daily (with meals)    Type 2 diabetes mellitus with stage 3 chronic kidney disease, without long-term current use of insulin (H)       order for DME     1 each    Glucometer, brand as covered by insurance.    Type 2 diabetes mellitus with stage 3 chronic kidney disease, without long-term current use of insulin (H)       simvastatin 40 MG tablet    ZOCOR    90 tablet    Take 1 tablet (40 mg) by mouth At Bedtime    Hyperlipidemia LDL goal <70

## 2018-09-21 NOTE — PROGRESS NOTES
SUBJECTIVE:   Camilo Young is a 84 year old male who presents to clinic today for the following health issues:      Diabetes Follow-up    Patient is checking blood sugars: once daily.  Results are as follows:         am - 100-120    Diabetic concerns: None     Symptoms of hypoglycemia (low blood sugar): none     Paresthesias (numbness or burning in feet) or sores: No     Date of last diabetic eye exam: 2017    Diabetes Management Resources    Hyperlipidemia Follow-Up      Rate your low fat/cholesterol diet?: not monitoring fat    Taking statin?  Yes, no muscle aches from statin    Other lipid medications/supplements?:  none    Hypertension Follow-up      Outpatient blood pressures are not being checked.    Low Salt Diet: not monitoring salt    BP Readings from Last 2 Encounters:   09/21/18 138/70   05/21/18 126/65     Hemoglobin A1C (%)   Date Value   09/21/2018 8.5 (H)   04/24/2018 7.9 (H)     LDL Cholesterol Calculated (mg/dL)   Date Value   04/24/2018 51   03/17/2017 75       Amount of exercise or physical activity: 2-3 days/week for an average of 15-30 minutes    Problems taking medications regularly: No    Medication side effects: none    Diet: regular (no restrictions)      Recheck of copd. Stable.   Denies sob/chest pain/palps. Feeling well overall.   No dizziness or ha's. No neuropathic pain /numbness.     Problem list and histories reviewed & adjusted, as indicated.  Additional history: as documented    BP Readings from Last 3 Encounters:   09/21/18 138/70   05/21/18 126/65   05/03/18 160/83    Wt Readings from Last 3 Encounters:   09/21/18 216 lb (98 kg)   05/21/18 210 lb (95.3 kg)   05/03/18 206 lb 12.8 oz (93.8 kg)                    Reviewed and updated as needed this visit by clinical staff  Tobacco  Allergies  Meds  Problems  Med Hx  Surg Hx  Fam Hx  Soc Hx        Reviewed and updated as needed this visit by Provider  Tobacco  Allergies  Meds  Problems  Med Hx  Surg Hx  Fam Hx  Soc Hx           All other systems negative except as outline above  OBJECTIVE:  Eye exam - right eye normal lid, conjunctiva, cornea, pupil and fundus, left eye normal lid, conjunctiva, cornea, pupil and fundus.  Thyroid not palpable, not enlarged, no nodules detected.  CHEST:no tachypnea, retractions or cyanosis, air entry reduced diffusely throughout both lungs and S1, S2 normal, no murmur, no gallop, rate regular.  Foot exam - both sides normal; no swelling, tenderness or skin or vascular lesions. Color and temperature is normal. Sensation is intact. Peripheral pulses are palpable. Toenails are normal.    Camilo was seen today for diabetes.    Diagnoses and all orders for this visit:    Type 2 diabetes mellitus with stage 3 chronic kidney disease, without long-term current use of insulin (H)  -     Hemoglobin A1c  -     Albumin Random Urine Quantitative with Creat Ratio  -     JUST IN CASE  -     glipiZIDE (GLUCOTROL XL) 10 MG 24 hr tablet; Take 2 tablets (20 mg) by mouth daily  -     metFORMIN (GLUCOPHAGE) 1000 MG tablet; Take 1 tablet (1,000 mg) by mouth 2 times daily (with meals)  -     linagliptin (TRADJENTA) 5 MG TABS tablet; Take 1 tablet (5 mg) by mouth daily    Essential hypertension with goal blood pressure less than 140/90  -     amLODIPine (NORVASC) 10 MG tablet; Take 1 tablet (10 mg) by mouth daily    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)  -     COPD ACTION PLAN  -     budesonide-formoterol (SYMBICORT) 160-4.5 MCG/ACT Inhaler; Inhale 2 puffs into the lungs 2 times daily    Hypertension goal BP (blood pressure) < 140/90  -     losartan (COZAAR) 50 MG tablet; Take 1 tablet (50 mg) by mouth daily    Hyperlipidemia LDL goal <70  -     simvastatin (ZOCOR) 40 MG tablet; Take 1 tablet (40 mg) by mouth At Bedtime      work on lifestyle modification  Recheck in 3-4 mos.

## 2018-09-21 NOTE — LETTER
My COPD Action Plan     Name: Camilo Young    YOB: 1934   Date: 9/21/2018    My doctor: Aubrey Delgado PA-C   My clinic: [place] [id number]  [place] [id number] [phone]  My Controller Medicine: { :022841}   Dose: ***     My Rescue Medicine: { :654854}   Dose: ***     My Flare Up Medicine: { :328777}   Dose: ***     My COPD Severity: { :912208}      Use of Oxygen: { :670289}     Make sure you've had your pneumonia   vaccines.          GREEN ZONE       Doing well today      Usual level of activity and exercise    Usual amount of cough and mucus    No shortness of breath    Usual level of health (thinking clearly, sleeping well, feel like eating) Actions:      Take daily medicines    Use oxygen as prescribed    Follow regular exercise and diet plan    Avoid cigarette smoke and other irritants that harm the lungs           YELLOW ZONE          Having a bad day or flare up      Short of breath more than usual    A lot more sputum (mucus) than usual    Sputum looks yellow, green, tan, brown or bloody    More coughing or wheezing    Fever or chills    Less energy; trouble completing activities    Trouble thinking or focusing    Using quick relief inhaler or nebulizer more often    Poor sleep; symptoms wake me up    Do not feel like eating Actions:      Get plenty of rest    Take daily medicines    Use quick relief inhaler every *** hours    If you use oxygen, call you doctor to see if you should adjust your oxygen    Do breathing exercises or other things to help you relax    Let a loved one, friend or neighbor know you are feeling worse    Call your care team if you have 2 or more symptoms.  Start taking steroids or antibiotics if directed by your care team           RED ZONE       Need medical care now      Severe shortness of breath (feel you can't breathe)    Fever, chills    Not enough breath to do any activity    Trouble coughing up mucus, walking or  talking    Blood in mucus    Frequent coughing   Rescue medicines are not working    Not able to sleep because of breathing    Feel confused or drowsy    Chest pain    Actions:      Call your health care team.  If you cannot reach your care team, call 911 or go to the emergency room.        Annual Reminders:  Meet with Care Team, Flu Shot every Fall  Pharmacy: [id number] [place], [place] - [address] [address]

## 2018-12-24 ENCOUNTER — TELEPHONE (OUTPATIENT)
Dept: FAMILY MEDICINE | Facility: CLINIC | Age: 83
End: 2018-12-24

## 2018-12-24 DIAGNOSIS — E11.22 TYPE 2 DIABETES MELLITUS WITH STAGE 3 CHRONIC KIDNEY DISEASE, WITHOUT LONG-TERM CURRENT USE OF INSULIN (H): ICD-10-CM

## 2018-12-24 DIAGNOSIS — J41.8 MIXED SIMPLE AND MUCOPURULENT CHRONIC BRONCHITIS (H): ICD-10-CM

## 2018-12-24 DIAGNOSIS — N18.30 TYPE 2 DIABETES MELLITUS WITH STAGE 3 CHRONIC KIDNEY DISEASE, WITHOUT LONG-TERM CURRENT USE OF INSULIN (H): ICD-10-CM

## 2018-12-24 NOTE — TELEPHONE ENCOUNTER
Pt requesting new prescriptions so he can fax them to [facility] to be filled.    Medications pended for MD approval   Trisha Gannon, RN, BSN

## 2018-12-24 NOTE — TELEPHONE ENCOUNTER
pt came to clinic REQUESTING PAPER copy for RX to send/fax off to [facility] ( [place])  He needs the Symbicort and the new diabetes medication  ( he wasn't sure what name it was ( he had gone to get at [facility] but  Running around $1500  For 3 RX's. pt has used [facility] prior... PT IS ASKING if possible to spend up ( did inform of 3 day)  Patient has only 3 pills left.... OK TO LEAVE MESSAGE when ready to    Thanks   KENDRICK BUSTAMANTE

## 2018-12-26 RX ORDER — BUDESONIDE AND FORMOTEROL FUMARATE DIHYDRATE 160; 4.5 UG/1; UG/1
2 AEROSOL RESPIRATORY (INHALATION) 2 TIMES DAILY
Qty: 3 INHALER | Refills: 3 | Status: SHIPPED | OUTPATIENT
Start: 2018-12-26 | End: 2019-07-23

## 2018-12-26 NOTE — TELEPHONE ENCOUNTER
Hard copy of script for Symbicort 160-4.5 mg inhaler and Tradjenta 5 mg tabs brought to  [facility]  for patient to .

## 2019-03-04 ENCOUNTER — TRANSFERRED RECORDS (OUTPATIENT)
Dept: HEALTH INFORMATION MANAGEMENT | Facility: CLINIC | Age: 84
End: 2019-03-04

## 2019-03-21 ENCOUNTER — TRANSFERRED RECORDS (OUTPATIENT)
Dept: HEALTH INFORMATION MANAGEMENT | Facility: CLINIC | Age: 84
End: 2019-03-21

## 2019-03-22 ENCOUNTER — TELEPHONE (OUTPATIENT)
Dept: OPHTHALMOLOGY | Facility: CLINIC | Age: 84
End: 2019-03-22

## 2019-03-22 NOTE — TELEPHONE ENCOUNTER
"Patient came into the office and is very upset that Saqib has not returned his call to schedule cataract surgery.  He came in and talked to Saqib recently and was given a business card with with potential surgical dates on it, starting March 11, March 25th, and two additional dates.  He told Saqib he had to talk to his kids to arrange a ride for surgery and for the post op appointments.  He came in again on 3-18-19 and was told that Saqib was busy, but that she would call him back (he had arranged with his rides, and wanted to proceed with the March 25th date).  He did not hear from Saqib all week, and is now very upset to learn that he cannot have surgery on the 25th, because he would need to have a History and Physical and an additional appointment with Dr. Barrios before surgery.  I apologized for the lack of communication, and told him that Saqib was not here today, but I would make sure he was contacted on Monday, the 25th, to schedule a surgery date.  He told me that he was \"very hot\" and frustrated, and just wanted to go elsewhere for his surgery.  He also told me to \"just wait until my daughter hears about this-  You haven't heard the last of this.  If you think I'm hot, she'll be even hotter!\"    "

## 2019-06-17 DIAGNOSIS — E11.22 TYPE 2 DIABETES MELLITUS WITH STAGE 3 CHRONIC KIDNEY DISEASE, WITHOUT LONG-TERM CURRENT USE OF INSULIN (H): ICD-10-CM

## 2019-06-17 DIAGNOSIS — I10 HYPERTENSION GOAL BP (BLOOD PRESSURE) < 140/90: ICD-10-CM

## 2019-06-17 DIAGNOSIS — N18.30 TYPE 2 DIABETES MELLITUS WITH STAGE 3 CHRONIC KIDNEY DISEASE, WITHOUT LONG-TERM CURRENT USE OF INSULIN (H): ICD-10-CM

## 2019-06-17 NOTE — TELEPHONE ENCOUNTER
"Requested Prescriptions   Pending Prescriptions Disp Refills     metFORMIN (GLUCOPHAGE) 1000 MG tablet [Pharmacy Med Name: metFORMIN HCl Oral Tablet 1000 MG] 60 tablet 0     Sig: Take 1 tablet (1,000 mg) by mouth 2 times daily (with meals) Needs office visit for refills       Biguanide Agents Failed - 6/17/2019  3:16 PM        Failed - Blood pressure less than 140/90 in past 6 months     BP Readings from Last 3 Encounters:   09/21/18 138/70   05/21/18 126/65   05/03/18 160/83                 Failed - Patient has documented LDL within the past 12 mos.     Recent Labs   Lab Test 04/24/18  0934   LDL 51             Failed - Patient has documented A1c within the specified period of time.     If HgbA1C is 8 or greater, it needs to be on file within the past 3 months.  If less than 8, must be on file within the past 6 months.     Recent Labs   Lab Test 09/21/18  0944   A1C 8.5*             Failed - Patient's CR is NOT>1.4 OR Patient's EGFR is NOT<45 within past 12 mos.     Recent Labs   Lab Test 04/24/18  0934   GFRESTIMATED 63   GFRESTBLACK 76       Recent Labs   Lab Test 04/24/18  0934   CR 1.11             Failed - Recent (6 mo) or future (30 days) visit within the authorizing provider's specialty     Patient had office visit in the last 6 months or has a visit in the next 30 days with authorizing provider or within the authorizing provider's specialty.  See \"Patient Info\" tab in inbasket, or \"Choose Columns\" in Meds & Orders section of the refill encounter.            Passed - Patient has had a Microalbumin in the past 15 mos.     Recent Labs   Lab Test 09/21/18  0950   MICROL 28   UMALCR 59.16*             Passed - Patient is age 10 or older        Passed - Patient does NOT have a diagnosis of CHF.        Passed - Medication is active on med list        Last Written Prescription Date:  5/16/19  Last Fill Quantity: 60,  # refills: 0   Last office visit: 9/21/2018 with prescribing provider:  Aubrey Delgado   "   Future Office Visit:

## 2019-06-17 NOTE — TELEPHONE ENCOUNTER
"Requested Prescriptions   Pending Prescriptions Disp Refills     glipiZIDE (GLUCOTROL XL) 10 MG 24 hr tablet [Pharmacy Med Name: glipiZIDE ER Oral Tablet Extended Release 24 Hour 10 MG] 180 tablet 0     Sig: Take 2 tablets (20 mg) by mouth daily       Sulfonylurea Agents Failed - 6/17/2019  3:16 PM        Failed - Blood pressure less than 140/90 in past 6 months     BP Readings from Last 3 Encounters:   09/21/18 138/70   05/21/18 126/65   05/03/18 160/83                 Failed - Patient has documented LDL within the past 12 mos.     Recent Labs   Lab Test 04/24/18  0934   LDL 51             Failed - Patient has documented A1c within the specified period of time.     If HgbA1C is 8 or greater, it needs to be on file within the past 3 months.  If less than 8, must be on file within the past 6 months.     Recent Labs   Lab Test 09/21/18  0944   A1C 8.5*             Failed - Patient has a recent creatinine (normal) within the past 12 mos.     Recent Labs   Lab Test 04/24/18  0934   CR 1.11             Failed - Recent (6 mo) or future (30 days) visit within the authorizing provider's specialty     Patient had office visit in the last 6 months or has a visit in the next 30 days with authorizing provider or within the authorizing provider's specialty.  See \"Patient Info\" tab in inbasket, or \"Choose Columns\" in Meds & Orders section of the refill encounter.            Passed - Patient has had a Microalbumin in the past 15 mos.     Recent Labs   Lab Test 09/21/18  0950   MICROL 28   UMALCR 59.16*             Passed - Medication is active on med list        Passed - Patient is age 18 or older        losartan (COZAAR) 25 MG tablet [Pharmacy Med Name: Losartan Potassium Oral Tablet 25 MG] 90 tablet 0     Sig: Take 1 tablet (25 mg) by mouth daily       Angiotensin-II Receptors Failed - 6/17/2019  3:16 PM        Failed - Normal serum creatinine on file in past 12 months     Recent Labs   Lab Test 04/24/18  0934   CR 1.11         " "    Failed - Normal serum potassium on file in past 12 months     Recent Labs   Lab Test 04/24/18  0934   POTASSIUM 4.6                    Passed - Blood pressure under 140/90 in past 12 months     BP Readings from Last 3 Encounters:   09/21/18 138/70   05/21/18 126/65   05/03/18 160/83                 Passed - Recent (12 mo) or future (30 days) visit within the authorizing provider's specialty     Patient had office visit in the last 12 months or has a visit in the next 30 days with authorizing provider or within the authorizing provider's specialty.  See \"Patient Info\" tab in inbasket, or \"Choose Columns\" in Meds & Orders section of the refill encounter.              Passed - Medication is active on med list        Passed - Patient is age 18 or older        Last Written Prescription Date:  9/21/18  Last Fill Quantity: 180,  # refills: 1   Last office visit: 9/21/2018 with prescribing provider:  Aubrey Delgado     Future Office Visit:      "

## 2019-06-18 NOTE — TELEPHONE ENCOUNTER
Routing refill request to provider for review/approval because:  Jessica given x1 and patient did not follow up, please advise    Roya Conti RN, BSN, PHN

## 2019-06-18 NOTE — TELEPHONE ENCOUNTER
Patient is overdue for diabetes follow up.  30 day elizabeth period fill pended for provider approval with reminder to patient to schedule appointment for further refills.   Please advise on elizabeth refill.     Roya Conti RN, BSN, PHN

## 2019-06-19 RX ORDER — GLIPIZIDE 10 MG/1
TABLET, FILM COATED, EXTENDED RELEASE ORAL
Qty: 60 TABLET | Refills: 0 | Status: SHIPPED | OUTPATIENT
Start: 2019-06-19 | End: 2019-07-23

## 2019-06-19 RX ORDER — LOSARTAN POTASSIUM 25 MG/1
25 TABLET ORAL DAILY
Qty: 30 TABLET | Refills: 0 | Status: SHIPPED | OUTPATIENT
Start: 2019-06-19 | End: 2019-07-23

## 2019-07-09 DIAGNOSIS — E78.5 HYPERLIPIDEMIA LDL GOAL <70: ICD-10-CM

## 2019-07-09 DIAGNOSIS — E11.22 TYPE 2 DIABETES MELLITUS WITH STAGE 3 CHRONIC KIDNEY DISEASE, WITHOUT LONG-TERM CURRENT USE OF INSULIN (H): ICD-10-CM

## 2019-07-09 DIAGNOSIS — N18.30 TYPE 2 DIABETES MELLITUS WITH STAGE 3 CHRONIC KIDNEY DISEASE, WITHOUT LONG-TERM CURRENT USE OF INSULIN (H): ICD-10-CM

## 2019-07-09 NOTE — TELEPHONE ENCOUNTER
"Requested Prescriptions   Pending Prescriptions Disp Refills     simvastatin (ZOCOR) 40 MG tablet [Pharmacy Med Name: Simvastatin Oral Tablet 40 MG] 90 tablet 0     Sig: Take 1 tablet (40 mg) by mouth At Bedtime  Last Written Prescription Date:  3/24/19  Last Fill Quantity: 90,  # refills: 0   Last office visit: 9/21/2018 with prescribing provider:  EFREM Delgado   Future Office Visit:   Next 5 appointments (look out 90 days)    Jul 23, 2019  7:40 AM CDT  Office Visit with Aubrey Delgado PA-C  [facility] [address] [address]  [phone]              Statins Protocol Failed - 7/9/2019  4:16 PM        Failed - LDL on file in past 12 months     Recent Labs   Lab Test 04/24/18  0934   LDL 51             Passed - No abnormal creatine kinase in past 12 months     No lab results found.             Passed - Recent (12 mo) or future (30 days) visit within the authorizing provider's specialty     Patient had office visit in the last 12 months or has a visit in the next 30 days with authorizing provider or within the authorizing provider's specialty.  See \"Patient Info\" tab in inbasket, or \"Choose Columns\" in Meds & Orders section of the refill encounter.              Passed - Medication is active on med list        Passed - Patient is age 18 or older        metFORMIN (GLUCOPHAGE) 1000 MG tablet [Pharmacy Med Name: metFORMIN HCl Oral Tablet 1000 MG] 60 tablet 0     Sig: Take 1 tablet (1,000 mg) by mouth 2 times daily (with meals) Needs office visit for refills  Last Written Prescription Date:  6/19/19  Last Fill Quantity: 60,  # refills: 0   Last office visit: 9/21/2018 with prescribing provider:  EFREM Delgado   Future Office Visit:   Next 5 appointments (look out 90 days)    Jul 23, 2019  7:40 AM CDT  Office Visit with Aubrey Delgado PA-C  [facility] ([facility]) [address] [address]  [phone]              " "Biguanide Agents Failed - 7/9/2019  4:16 PM        Failed - Blood pressure less than 140/90 in past 6 months     BP Readings from Last 3 Encounters:   09/21/18 138/70   05/21/18 126/65   05/03/18 160/83                 Failed - Patient has documented LDL within the past 12 mos.     Recent Labs   Lab Test 04/24/18  0934   LDL 51             Failed - Patient has documented A1c within the specified period of time.     If HgbA1C is 8 or greater, it needs to be on file within the past 3 months.  If less than 8, must be on file within the past 6 months.     Recent Labs   Lab Test 09/21/18  0944   A1C 8.5*             Failed - Patient's CR is NOT>1.4 OR Patient's EGFR is NOT<45 within past 12 mos.     Recent Labs   Lab Test 04/24/18  0934   GFRESTIMATED 63   GFRESTBLACK 76       Recent Labs   Lab Test 04/24/18  0934   CR 1.11             Passed - Patient has had a Microalbumin in the past 15 mos.     Recent Labs   Lab Test 09/21/18  0950   MICROL 28   UMALCR 59.16*             Passed - Patient is age 10 or older        Passed - Patient does NOT have a diagnosis of CHF.        Passed - Medication is active on med list        Passed - Recent (6 mo) or future (30 days) visit within the authorizing provider's specialty     Patient had office visit in the last 6 months or has a visit in the next 30 days with authorizing provider or within the authorizing provider's specialty.  See \"Patient Info\" tab in inbasket, or \"Choose Columns\" in Meds & Orders section of the refill encounter.            "

## 2019-07-10 RX ORDER — SIMVASTATIN 40 MG
40 TABLET ORAL AT BEDTIME
Qty: 30 TABLET | Refills: 0 | Status: SHIPPED | OUTPATIENT
Start: 2019-07-10 | End: 2019-07-23

## 2019-07-23 ENCOUNTER — OFFICE VISIT (OUTPATIENT)
Dept: FAMILY MEDICINE | Facility: CLINIC | Age: 84
End: 2019-07-23
Payer: COMMERCIAL

## 2019-07-23 VITALS
BODY MASS INDEX: 32.74 KG/M2 | HEART RATE: 58 BPM | TEMPERATURE: 98.2 F | OXYGEN SATURATION: 96 % | DIASTOLIC BLOOD PRESSURE: 75 MMHG | HEIGHT: 68 IN | SYSTOLIC BLOOD PRESSURE: 133 MMHG | RESPIRATION RATE: 17 BRPM | WEIGHT: 216 LBS

## 2019-07-23 DIAGNOSIS — E78.5 HYPERLIPIDEMIA LDL GOAL <70: ICD-10-CM

## 2019-07-23 DIAGNOSIS — R79.89 ELEVATED TSH: ICD-10-CM

## 2019-07-23 DIAGNOSIS — I48.91 ATRIAL FIBRILLATION, UNSPECIFIED TYPE (H): ICD-10-CM

## 2019-07-23 DIAGNOSIS — N18.30 TYPE 2 DIABETES MELLITUS WITH STAGE 3 CHRONIC KIDNEY DISEASE, WITHOUT LONG-TERM CURRENT USE OF INSULIN (H): ICD-10-CM

## 2019-07-23 DIAGNOSIS — I10 ESSENTIAL HYPERTENSION WITH GOAL BLOOD PRESSURE LESS THAN 140/90: Primary | ICD-10-CM

## 2019-07-23 DIAGNOSIS — I10 HYPERTENSION GOAL BP (BLOOD PRESSURE) < 140/90: ICD-10-CM

## 2019-07-23 DIAGNOSIS — R53.83 FATIGUE, UNSPECIFIED TYPE: ICD-10-CM

## 2019-07-23 DIAGNOSIS — I49.9 IRREGULAR HEART RATE: ICD-10-CM

## 2019-07-23 DIAGNOSIS — J41.8 MIXED SIMPLE AND MUCOPURULENT CHRONIC BRONCHITIS (H): ICD-10-CM

## 2019-07-23 DIAGNOSIS — E11.22 TYPE 2 DIABETES MELLITUS WITH STAGE 3 CHRONIC KIDNEY DISEASE, WITHOUT LONG-TERM CURRENT USE OF INSULIN (H): ICD-10-CM

## 2019-07-23 LAB
ANION GAP SERPL CALCULATED.3IONS-SCNC: 7 MMOL/L (ref 3–14)
BUN SERPL-MCNC: 11 MG/DL (ref 7–30)
CALCIUM SERPL-MCNC: 8.6 MG/DL (ref 8.5–10.1)
CHLORIDE SERPL-SCNC: 106 MMOL/L (ref 94–109)
CHOLEST SERPL-MCNC: 148 MG/DL
CO2 SERPL-SCNC: 26 MMOL/L (ref 20–32)
CREAT SERPL-MCNC: 1.22 MG/DL (ref 0.66–1.25)
GFR SERPL CREATININE-BSD FRML MDRD: 54 ML/MIN/{1.73_M2}
GLUCOSE SERPL-MCNC: 173 MG/DL (ref 70–99)
HBA1C MFR BLD: 8.9 % (ref 0–5.6)
HDLC SERPL-MCNC: 55 MG/DL
LDLC SERPL CALC-MCNC: 77 MG/DL
NONHDLC SERPL-MCNC: 93 MG/DL
POTASSIUM SERPL-SCNC: 4.8 MMOL/L (ref 3.4–5.3)
SODIUM SERPL-SCNC: 139 MMOL/L (ref 133–144)
TRIGL SERPL-MCNC: 78 MG/DL
TSH SERPL DL<=0.005 MIU/L-ACNC: 11.32 MU/L (ref 0.4–4)

## 2019-07-23 PROCEDURE — 99207 C FOOT EXAM  NO CHARGE: CPT | Performed by: PHYSICIAN ASSISTANT

## 2019-07-23 PROCEDURE — 36415 COLL VENOUS BLD VENIPUNCTURE: CPT | Performed by: PHYSICIAN ASSISTANT

## 2019-07-23 PROCEDURE — 80061 LIPID PANEL: CPT | Performed by: PHYSICIAN ASSISTANT

## 2019-07-23 PROCEDURE — 80048 BASIC METABOLIC PNL TOTAL CA: CPT | Performed by: PHYSICIAN ASSISTANT

## 2019-07-23 PROCEDURE — 84443 ASSAY THYROID STIM HORMONE: CPT | Performed by: PHYSICIAN ASSISTANT

## 2019-07-23 PROCEDURE — 93000 ELECTROCARDIOGRAM COMPLETE: CPT | Performed by: PHYSICIAN ASSISTANT

## 2019-07-23 PROCEDURE — 99214 OFFICE O/P EST MOD 30 MIN: CPT | Performed by: PHYSICIAN ASSISTANT

## 2019-07-23 PROCEDURE — 83036 HEMOGLOBIN GLYCOSYLATED A1C: CPT | Performed by: PHYSICIAN ASSISTANT

## 2019-07-23 RX ORDER — BUDESONIDE AND FORMOTEROL FUMARATE DIHYDRATE 160; 4.5 UG/1; UG/1
2 AEROSOL RESPIRATORY (INHALATION) 2 TIMES DAILY
Qty: 3 INHALER | Refills: 3 | Status: SHIPPED | OUTPATIENT
Start: 2019-07-23 | End: 2020-02-11

## 2019-07-23 RX ORDER — LOSARTAN POTASSIUM 25 MG/1
25 TABLET ORAL DAILY
Qty: 90 TABLET | Refills: 1 | Status: SHIPPED | OUTPATIENT
Start: 2019-07-23 | End: 2020-02-11

## 2019-07-23 RX ORDER — SIMVASTATIN 40 MG
40 TABLET ORAL AT BEDTIME
Qty: 90 TABLET | Refills: 3 | Status: SHIPPED | OUTPATIENT
Start: 2019-07-23 | End: 2020-07-29

## 2019-07-23 RX ORDER — GLIPIZIDE 10 MG/1
TABLET, FILM COATED, EXTENDED RELEASE ORAL
Qty: 180 TABLET | Refills: 1 | Status: SHIPPED | OUTPATIENT
Start: 2019-07-23 | End: 2020-02-04

## 2019-07-23 RX ORDER — METOPROLOL SUCCINATE 25 MG/1
25 TABLET, EXTENDED RELEASE ORAL DAILY
Qty: 90 TABLET | Refills: 3 | Status: SHIPPED | OUTPATIENT
Start: 2019-07-23 | End: 2019-07-31

## 2019-07-23 ASSESSMENT — MIFFLIN-ST. JEOR: SCORE: 1639.27

## 2019-07-23 NOTE — PROGRESS NOTES
Subjective      Camilo Young is a 85 year old male who presents to clinic today for the following health issues:    HPI   Diabetes Follow-up  a1c elevated again. Refuses to start insulin. tradjenta was too expensive. Would like to work on lifestyle changes and recheck in 3 mos.     How often are you checking your blood sugar? A few times a month    What time of day are you checking your blood sugars (select all that apply)?  Before meals--this morning 180--last week in the morning was 125    Have you had any blood sugars above 200?  No    Have you had any blood sugars below 70?  No    What symptoms do you notice when your blood sugar is low?  None    What concerns do you have today about your diabetes? Other: sugars have been elevated in the mornings lately     Do you have any of these symptoms? (Select all that apply)  No numbness or tingling in feet.  No redness, sores or blisters on feet.  No complaints of excessive thirst.  No reports of blurry vision.  No significant changes to weight.     Have you had a diabetic eye exam in the last 12 months? No    Diabetes Management Resources    Hyperlipidemia Follow-Up      Are you having any of the following symptoms? (Select all that apply)  No complaints of shortness of breath, chest pain or pressure.  No increased sweating or nausea with activity.  No left-sided neck or arm pain.  No complaints of pain in calves when walking 1-2 blocks.    Are you regularly taking any medication or supplement to lower your cholesterol?   Yes- simvastatin 40mg 1 tablet nightly    Are you having muscle aches or other side effects that you think could be caused by your cholesterol lowering medication?  No    Hypertension Follow-up      Do you check your blood pressure regularly outside of the clinic? No     Are you following a low salt diet? No    Are your blood pressures ever more than 140 on the top number (systolic) OR more   than 90 on the bottom number (diastolic), for example  "140/90? No    BP Readings from Last 2 Encounters:   07/23/19 133/75   09/21/18 138/70     Hemoglobin A1C (%)   Date Value   07/23/2019 8.9 (H)   09/21/2018 8.5 (H)     LDL Cholesterol Calculated (mg/dL)   Date Value   04/24/2018 51   03/17/2017 75       Amount of exercise or physical activity: None    Problems taking medications regularly: No    Medication side effects: lightheadedness    Diet: regular (no restrictions)          No Known Allergies  Recent Labs   Lab Test 07/23/19  0752 09/21/18  0944 04/24/18  0934 02/01/18  1228 10/03/17  1103 03/17/17  1042  03/18/16  0947   A1C 8.9* 8.5* 7.9* 8.8* 8.1* 7.4*   < > 7.2*   LDL  --   --  51  --   --  75  --  66   HDL  --   --  50  --   --  51  --  69   TRIG  --   --  90  --   --  99  --  75   CR  --   --  1.11 1.34*  --  1.32*   < > 1.03   GFRESTIMATED  --   --  63 51*  --  52*   < > 69   GFRESTBLACK  --   --  76 61  --  63   < > 84   POTASSIUM  --   --  4.6 4.2  --  4.6   < > 4.3   TSH  --   --   --   --  4.90* 4.57*  --   --     < > = values in this interval not displayed.      BP Readings from Last 3 Encounters:   07/23/19 133/75   09/21/18 138/70   05/21/18 126/65    Wt Readings from Last 3 Encounters:   07/23/19 98 kg (216 lb)   09/21/18 98 kg (216 lb)   05/21/18 95.3 kg (210 lb)                      Reviewed and updated as needed this visit by Provider         Review of Systems   ROS COMP: Constitutional, HEENT, cardiovascular, pulmonary, GI, , musculoskeletal, neuro, skin, endocrine and psych systems are negative, except as otherwise noted.      Objective    /75   Pulse 58   Temp 98.2  F (36.8  C) (Tympanic)   Resp 17   Ht 1.727 m (5' 8\")   Wt 98 kg (216 lb)   SpO2 96%   BMI 32.84 kg/m    Body mass index is 32.84 kg/m .  Physical Exam     Eye exam - right eye normal lid, conjunctiva, cornea, pupil and fundus, left eye normal lid, conjunctiva, cornea, pupil and fundus.  Thyroid not palpable, not enlarged, no nodules detected.  CHEST:no tachypnea, " retractions or cyanosis, air entry reduced both lower lobes and Heart exam detail:irregularly irregular rhythm, chest is clear without rales or wheezing, no pedal edema, no JVD, no hepatosplenomegaly.  Examination of the feet reveals no trophic changes or ulcerative lesions, normal sensory exam, DP reduced bilateral and PT reduced bilateral. Patient denies claudication symptoms.   [id number] score: 6 (20 %).    Camilo was seen today for diabetes.    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)  -     budesonide-formoterol (SYMBICORT) 160-4.5 MCG/ACT Inhaler; Inhale 2 puffs into the lungs 2 times daily    Type 2 diabetes mellitus with stage 3 chronic kidney disease, without long-term current use of insulin (H)  -     Hemoglobin A1c  -     TSH  -     FOOT EXAM  -     glipiZIDE (GLUCOTROL XL) 10 MG 24 hr tablet; Take 2 tablets (20 mg) by mouth daily  -     metFORMIN (GLUCOPHAGE) 1000 MG tablet; Take 1 tablet (1,000 mg) by mouth 2 times daily (with meals)  -     DIABETES EDUCATOR REFERRAL    Hyperlipidemia LDL goal <70  -     Lipid panel reflex to direct LDL Fasting  -     simvastatin (ZOCOR) 40 MG tablet; Take 1 tablet (40 mg) by mouth At Bedtime    Hypertension goal BP (blood pressure) < 140/90  -     losartan (COZAAR) 25 MG tablet; Take 1 tablet (25 mg) by mouth daily    Irregular heart rate  -     EKG 12-lead complete w/read - Clinics    Atrial fibrillation, unspecified type (H)  -     rivaroxaban ANTICOAGULANT (XARELTO) 15 MG TABS tablet; Take 1 tablet (15 mg) by mouth daily (with dinner)  -     CARDIOLOGY EVAL ADULT REFERRAL  -     metoprolol succinate ER (TOPROL-XL) 25 MG 24 hr tablet; Take 1 tablet (25 mg) by mouth daily      work on lifestyle modification  Recheck in 3 mos

## 2019-07-23 NOTE — PATIENT INSTRUCTIONS
Stop your daily aspirin and start xarelto (a blood thinner).    Start taking metoprolol. This medication helps slow and control your heart rate.    Make an appointment to see the diabetes educator and discuss diet changes to help improve your blood sugars    Make an appointment to see the cardiologist to discuss management of your new finding of atrial fibrillation.   Patient Education     Understanding Atrial Fibrillation    An arrhythmia is any problem with the speed or pattern of the heartbeat. Atrial fibrillation (AFib) is a common type of arrhythmia. It causes fast, chaotic electrical signals in the atria. This leads to poor functioning of the heart. It also affects how much blood your heart can pump out to the body.  Afib may occur once in a while and go away on its own. Or it may continue for longer periods and need treatment.  AFib can lead to serious problems, such as stroke. Your healthcare provider will need to monitor and manage it.  What happens during atrial fibrillation?   The heart has an electrical system that sends signals to control the heartbeat. As signals move through the heart, they tell the heart s upper chambers (atria) and lower chambers (ventricles) when to squeeze (contract) and relax. This lets blood move through the heart and out to the body and lungs.  With AFib, the atria receive abnormal signals. This causes them to contract in a fast and irregular way, and out of sync with the ventricles. When this happens, the atria also have a harder time moving blood into the ventricles. Blood may then pool in the atria, which increases the risk for blood clots and stroke. The ventricles also may contract too quickly and irregularly. As a result, they may not pump blood to the body and lungs as well as they should. This can weaken the heart muscle over time and cause heart failure.  What causes atrial fibrillation?  AFib is more common in older adults. It has many possible causes  including:    Coronary artery disease    Heart valve disease    Heart attack    Heart surgery    High blood pressure    Thyroid disease    Diabetes    Lung disease    Sleep apnea    Heavy alcohol use  In some cases of AFib, doctors do not know the cause.  What are the symptoms of atrial fibrillation?  AFib may or may not cause symptoms. If symptoms do occur, they may include:    A fast, pounding, irregular heartbeat    Shortness of breath    Tiredness    Dizziness or fainting    Chest pain  How is atrial fibrillation treated?  Treatments for AFib can include any of the options below.    Medicines. You may be prescribed:  ? Heart rate medicines to help slow down the heartbeat  ? Heart rhythm medicines to help the heart beat more regularly  ? Anti-clotting medicines to help reduce the risk for blood clots and stroke    Electrical cardioversion. Your healthcare provider uses special pads or paddles to send one or more brief electrical shocks to the heart. This can help reset the heartbeat to normal.    Ablation. Long, thin tubes called catheters are threaded through a blood vessel to the heart. There, the catheters send out hot or cold energy to the areas causing the abnormal signals. This energy destroys the problem tissue or cells. This improves the chances that your heart will stay in normal rhythm without using medicines. If your heart rate and rhythm can t be controlled, you may need ablation and a pacemaker. These will help control the heart rate and regularity of the heartbeat.    Surgery. During surgery, your healthcare provider may use different methods to create scar tissue in the areas of the heart causing the abnormal signals. The scar tissue disrupts the abnormal signals and may stop AFib from occurring.  What are the complications of atrial fibrillation?  These can include:    Blood clots    Stroke    Heart failure. This problem occurs when the heart muscle weakens so much that it can no longer pump blood  well.  When should I call my healthcare provider?  Call your healthcare provider right away if you have any of these:    Symptoms that don t get better with treatment, or get worse    New symptoms   Date Last Reviewed: 5/1/2016 2000-2018 [employer]. [address] [place] [id number]. All rights reserved. This information is not intended as a substitute for professional medical care. Always follow your healthcare professional's instructions.           Patient Education     What Is Atrial Flutter/Atrial Fibrillation?      The heart has its own electrical system. This system makes the signals that start each heartbeat. The heartbeat begins in 1 of the 2 upper chambers of the heart (atria). A problem can make the atria beat faster than normal. The atria may beat fast but still evenly. This problem is called atrial flutter. If the atria beat very fast and also unevenly, it is called atrial fibrillation (AFib).  Causes of Atrial Flutter and Atrial Fibrillation  Causes of these problems can include:    Previous heart attack    High blood pressure    Thyroid problems  In many cases, the cause is unknown.  When the Atria Beat Too Fast  The atria may beat fast only once in a while. This is called a paroxysmal heart rhythm problem. If they beat fast all the time, it is a chronic problem.  Atrial Flutter  With atrial flutter, electrical signals travel around and around inside the atria. These circling signals make the atria beat too fast:    Atrial flutter can cause symptoms similar to AFib. It can also lead to the even faster, uneven rhythms of AFib.  Atrial Fibrillation (AFib)  With AFib, cells in the atria send extra electrical signals. These extra signals make the atria beat very fast. They also beat unevenly:    The atria beat so fast and unevenly that they may quiver instead of khai. If the atria don t contract, they don t move enough blood into the 2 lower chambers of the heart  (ventricles). This can cause you to feel dizzy or weak.    Blood that doesn t keep moving can pool and form clots in the atria. These clots can move into other parts of the body and cause serious problems such as a stroke.   Symptoms of Atrial Flutter and AFib  These symptoms include the following:    Palpitations (a fluttering, fast heartbeat)    Weakness or tiredness    Shortness of breath    Chest pain or tightness    Dizziness or lightheadedness    Fainting spells   Date Last Reviewed: 2/26/2014 2000-2018 [employer]. [address] [place] [address]. All rights reserved. This information is not intended as a substitute for professional medical care. Always follow your healthcare professional's instructions.

## 2019-07-24 ENCOUNTER — TELEPHONE (OUTPATIENT)
Dept: FAMILY MEDICINE | Facility: CLINIC | Age: 84
End: 2019-07-24

## 2019-07-24 NOTE — TELEPHONE ENCOUNTER
Diabetes Education Scheduling Outreach #1:    Call to patient to schedule. Patient declined to schedule. He wants to wait until after he sees his cardiologist.    Ana Berger OnCall  Diabetes and Nutrition Scheduling

## 2019-07-31 ENCOUNTER — OFFICE VISIT (OUTPATIENT)
Dept: CARDIOLOGY | Facility: CLINIC | Age: 84
End: 2019-07-31
Attending: PHYSICIAN ASSISTANT
Payer: COMMERCIAL

## 2019-07-31 ENCOUNTER — TRANSFERRED RECORDS (OUTPATIENT)
Dept: HEALTH INFORMATION MANAGEMENT | Facility: CLINIC | Age: 84
End: 2019-07-31

## 2019-07-31 VITALS
OXYGEN SATURATION: 95 % | HEART RATE: 75 BPM | DIASTOLIC BLOOD PRESSURE: 81 MMHG | SYSTOLIC BLOOD PRESSURE: 129 MMHG | WEIGHT: 214 LBS | BODY MASS INDEX: 32.54 KG/M2

## 2019-07-31 DIAGNOSIS — I48.91 ATRIAL FIBRILLATION, UNSPECIFIED TYPE (H): ICD-10-CM

## 2019-07-31 DIAGNOSIS — I48.21 PERMANENT ATRIAL FIBRILLATION (H): Primary | ICD-10-CM

## 2019-07-31 DIAGNOSIS — Z87.09 HISTORY OF COPD: ICD-10-CM

## 2019-07-31 DIAGNOSIS — I51.89 MILD LEFT VENTRICULAR SYSTOLIC DYSFUNCTION: ICD-10-CM

## 2019-07-31 DIAGNOSIS — I10 ESSENTIAL HYPERTENSION WITH GOAL BLOOD PRESSURE LESS THAN 140/90: ICD-10-CM

## 2019-07-31 PROCEDURE — 93000 ELECTROCARDIOGRAM COMPLETE: CPT | Performed by: INTERNAL MEDICINE

## 2019-07-31 PROCEDURE — 99204 OFFICE O/P NEW MOD 45 MIN: CPT | Performed by: INTERNAL MEDICINE

## 2019-07-31 RX ORDER — METOPROLOL SUCCINATE 50 MG/1
50 TABLET, EXTENDED RELEASE ORAL DAILY
Qty: 90 TABLET | Refills: 3 | Status: SHIPPED | OUTPATIENT
Start: 2019-07-31 | End: 2020-07-24

## 2019-07-31 RX ORDER — AMLODIPINE BESYLATE 5 MG/1
5 TABLET ORAL DAILY
Qty: 90 TABLET | Refills: 3 | Status: SHIPPED | OUTPATIENT
Start: 2019-07-31 | End: 2020-07-24

## 2019-07-31 NOTE — PATIENT INSTRUCTIONS
Thank you for coming to the [facility] @ [facility]; please note the following instructions:    1. *DECREASE: Amlodipine to 5mg daily    2. *INCREASE: Metoprolol to 50mg daily. You may take 2 of the 25mg a day until gone.     3. Echocardiogram scheduled. please see following pages for appointment detail information.    4. Results will determine your heart follow up. Continue with Aubrey Delgado for follow up.        If you have any questions regarding your visit please contact your care team:     Cardiology  Telephone Number   Karen KIRKLAND, RN  Yojana BRIGGS, RN   Sherri MERIDA, RMELISABETH RIGGINS, RMELISABETH ESPINOSA, [facility]   [phone] (option 1)   For scheduling appts:     [phone] (select option 1)       For the Device Clinic (Pacemakers and ICD's)  RN's :  Shelli Lorenzo   During business hours: [phone]    *After business hours:  [phone] (select option 4)      Normal test result notifications will be released via [email or web address] or mailed within 7 business days.  All other test results, will be communicated via telephone once reviewed by your cardiologist.    If you need a medication refill please contact your pharmacy.  Please allow 3 business days for your refill to be completed.    As always, thank you for trusting us with your health care needs!

## 2019-07-31 NOTE — NURSING NOTE
"Chief Complaint   Patient presents with     Atrial Fib     Dr Mcgowan: Paolo, ref by Aubrey Delgado-PCP. EKG 7/23/19- RBBB, abnormal, ? Afib- xarelto started by PCP. Pt reports very ocassional chest pain, some dizziness, SOB on exertion.     Abnormal Ekg       Initial /81 (BP Location: Right arm, Patient Position: Chair, Cuff Size: Adult Large)   Pulse 75   Wt 97.1 kg (214 lb)   SpO2 95%   BMI 32.54 kg/m   Estimated body mass index is 32.54 kg/m  as calculated from the following:    Height as of 7/23/19: 1.727 m (5' 8\").    Weight as of this encounter: 97.1 kg (214 lb)..  BP completed using cuff size: nas Brunson MA    "

## 2019-07-31 NOTE — LETTER
7/31/2019      RE: Camilo Young  [address] [place] [address]       Dear Colleague,    Thank you for the opportunity to participate in the care of your patient, Camilo Young, at the [facility] AT [facility] at [facility]. Please see a copy of my visit note below.    Referring provider:Aubrey Delgado PA-C    Chief complaint: None    HPI: Mr. Camilo Young is a 85 year old  male with PMH significant for COPD, DM2, HTN and mildly reduced EF 45 to 50% in 2015.     The patient is being seen today for newly detected atrial fibrillation on EKG when patient was at his annual visit with his PCP on 7/23/2019.  He was started on metoprolol 25 and rivaroxaban 15 mg and referred to cardiology.  The patient was last seen in cardiology in 2015 for preoperative clearance.  At that time he was noted to have asymptomatic PVCs and EF of 45-50%.    The patient is doing well from cardiac standpoint.  He walks a lot and denies dyspnea on exertion, chest pain, palpitations, syncope.  Denies PND or lower extremity edema.  He reports dizziness from time to time.    The patient has a history of heavy smoking for 80 pack years.  Quit date 1991.  He is on inhalers for emphysema.  The patient is on oral antidiabetics for diabetes type 2.  Hyperlipidemia is well controlled.  Blood pressure is well controlled.  No prior history of CAD    Prior echo in 2015 showed EF of 45 to 50%.  Normal RV size and function.  No valve disease was noted.  No other cardiac tests available to review.    I have reviewed his ECG in clinic today which shows atrial fibrillation with frequent polymorphic PVCs.  Heart rate is  bpm.    Medications, personal, family, and social history reviewed with patient and revised.    PAST MEDICAL HISTORY:  Past Medical History:   Diagnosis Date     Arthritis      Chondral loose body of right knee joint      Diabetes mellitus type 2, controlled  (H)      Emphysema      Hyperlipidemia LDL goal <100 2012     Hypertension 2012     Nonsenile cataract      Osteoarthritis of right knee        CURRENT MEDICATIONS:  Current Outpatient Medications   Medication Sig Dispense Refill     amLODIPine (NORVASC) 10 MG tablet Take 1 tablet (10 mg) by mouth daily 90 tablet 1     blood glucose monitoring (ACCU-CHEK FASTCLIX) lancets 1 each 3 times daily 1 Box 5     blood glucose monitoring (ACCU-CHEK SMARTVIEW) test strip 1 strip by In Vitro route 3 times daily Or as recommended by provider 3 Month 1     budesonide-formoterol (SYMBICORT) 160-4.5 MCG/ACT Inhaler Inhale 2 puffs into the lungs 2 times daily 3 Inhaler 3     glipiZIDE (GLUCOTROL XL) 10 MG 24 hr tablet Take 2 tablets (20 mg) by mouth daily 180 tablet 1     losartan (COZAAR) 25 MG tablet Take 1 tablet (25 mg) by mouth daily 90 tablet 1     metFORMIN (GLUCOPHAGE) 1000 MG tablet Take 1 tablet (1,000 mg) by mouth 2 times daily (with meals) 180 tablet 1     metoprolol succinate ER (TOPROL-XL) 25 MG 24 hr tablet Take 1 tablet (25 mg) by mouth daily 90 tablet 3     order for DME Glucometer, brand as covered by insurance. 1 each 0     rivaroxaban ANTICOAGULANT (XARELTO) 15 MG TABS tablet Take 1 tablet (15 mg) by mouth daily (with dinner) 30 tablet 3     simvastatin (ZOCOR) 40 MG tablet Take 1 tablet (40 mg) by mouth At Bedtime 90 tablet 3       PAST SURGICAL HISTORY:  Past Surgical History:   Procedure Laterality Date     DECOMPRESSION, FUSION LUMBAR POSTERIOR ONE LEVEL, COMBINED         ALLERGIES:   No Known Allergies    FAMILY HISTORY:  Family History   Problem Relation Age of Onset     Cancer - colorectal Mother      C.A.D. Sister      Cancer Sister          SOCIAL HISTORY:  Social History     Tobacco Use     Smoking status: Former Smoker     Last attempt to quit: 1991     Years since quittin.2     Smokeless tobacco: Never Used   Substance Use Topics     Alcohol use: No     Drug use: No       ROS:    Constitutional: No fever, chills, or sweats. Weight stable.   ENT: No visual disturbance, ear ache, epistaxis, sore throat.   Cardiovascular: As per HPI.   Respiratory: No cough, hemoptysis.    GI: No nausea, vomiting, hematemesis, melena, or hematochezia.   : No hematuria.   Integument: Negative.   Psychiatric: Negative.   Hematologic:  No easy bruising, no easy bleeding.  Neuro: Negative.   Endocrinology: No significant heat or cold intolerance   Musculoskeletal: No myalgia.    Exam:  /81 (BP Location: Right arm, Patient Position: Chair, Cuff Size: Adult Large)   Pulse 75   Wt 97.1 kg (214 lb)   SpO2 95%   BMI 32.54 kg/m     GENERAL APPEARANCE: alert and no distress  HEENT: no icterus, no central cyanosis  LYMPH/NECK: no adenopathy, no asymmetry, JVP not elevated, no carotid bruits.  RESPIRATORY: Lung sounds diminished on both sides, no use of accessory muscles, no retractions, respirations are unlabored, normal respiratory rate  CARDIOVASCULAR: irregular rhythm, normal S1, S2, no S3 or S4 and no murmur, click or rub, precordium quiet with normal PMI.  GI: soft, non tender  EXTREMITIES: peripheral pulses normal, no edema  NEURO: alert, normal speech,and affect  VASC: Radial, dorsalis pedis and posterior tibialis pulses are normal in volumes and symmetric bilaterally.   SKIN: no ecchymoses, no rashes     I have reviewed the labs and personally reviewed the imaging below and made my comment in the assessment and plan.    Labs:  CBC RESULTS:   No results found for: WBC, RBC, HGB, HCT, MCV, MCH, MCHC, RDW, PLT    BMP RESULTS:  Lab Results   Component Value Date     07/23/2019    POTASSIUM 4.8 07/23/2019    CHLORIDE 106 07/23/2019    CO2 26 07/23/2019    ANIONGAP 7 07/23/2019     (H) 07/23/2019    BUN 11 07/23/2019    CR 1.22 07/23/2019    GFRESTIMATED 54 (L) 07/23/2019    GFRESTBLACK 62 07/23/2019    RICARDA 8.6 07/23/2019      Echocardiogram 4/23/2015  Borderline left ventricular dilation is  present. The Ejection Fraction is   estimated at 45-50%. Right ventricular function, chamber size, wall motion,   and thickness are normal. Pulmonary artery systolic pressure is normal. The   inferior vena cava  is normal. Ascending aorta 4 cm. No pericardial effusion   is present.    EKG 7/31/2019: Atrial fibrillation heart rate 100 bpm frequent PVCs    EKG 2015 sinus rhythm with PVCs    Assessment and Plan:   Mr. Camilo Young is a 85 year old  male with PMH significant for COPD, DM2, HTN and mildly reduced EF 45 to 50% in 2015.     1.  Atrial fibrillation:  This is a new diagnosis. It is difficult to determine if this is paroxysmal, persistent or permanent. Prior EKG is from 2015 which shows sinus rhythm.   Detected by EKG during routine physical exam with his PCP. The patient is asymptomatic. CHADSVaSc score is 5 (age 2, diabetes 1, hypertension 1, heart failure 1).  He is recently started on rivaroxaban 15 mg due to creatinine clearance of 50.  He is also started on metoprolol 25 mg.  Patient's heart rate during physical exam today is mildly elevated around 100 bpm.  Due to history of mildly reduced LV systolic function I recommended to increase metoprolol to 50 mg daily.  Patient reports mild dizziness which might be related to amlodipine.  Recommended to decrease amlodipine to 5mg.  Continue all other current medications.      2.  Mildly reduced EF at 45 to 50% detected in 2015: The patient is euvolemic and does not have heart failure symptoms. He does not have chest pain or dyspnea on exertion. Etiology is unclear.  He has frequent PVCs. 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  The patient doesnot have prior evaluation for ischemia. Recommended a new transthoracic echocardiogram. I will review the TTE result. He will need f/u with cardiology if there is worsening in LV function.    3.  Hypothyroidism: Patient's TSH is elevated at 11.32 recently checked by his PCP on 7/23/2019.  He will follow-up with Aubrey Delgado  [facility] [place].    4.  COPD: On inhalers.    Medication changes today: Amlodipine dose decreased from 10 to 5 mg, metoprolol dose increased from 25 to 50 mg daily.  Continue simvastatin and losartan same dose.    A total of 30 minutes spent face-toface with greater than 50% of the time spent in counseling and coordinating cares of the issues above.     Please donot hesitate to contact me if you have any questions or concerns. Again, thank you for allowing me to participate in the care of your patient.    Angelina HATHAWAY MD  [facility] Division of Cardiology  Pager [phone]

## 2019-07-31 NOTE — PROGRESS NOTES
Referring provider:Aubrey Delgado PA-C    Chief complaint: None    HPI: Mr. Camilo Young is a 85 year old  male with PMH significant for COPD, DM2, HTN and mildly reduced EF 45 to 50% in 2015.     The patient is being seen today for newly detected atrial fibrillation on EKG when patient was at his annual visit with his PCP on 7/23/2019.  He was started on metoprolol 25 and rivaroxaban 15 mg and referred to cardiology.  The patient was last seen in cardiology in 2015 for preoperative clearance.  At that time he was noted to have asymptomatic PVCs and EF of 45-50%.    The patient is doing well from cardiac standpoint.  He walks a lot and denies dyspnea on exertion, chest pain, palpitations, syncope.  Denies PND or lower extremity edema.  He reports dizziness from time to time.    The patient has a history of heavy smoking for 80 pack years.  Quit date 1991.  He is on inhalers for emphysema.  The patient is on oral antidiabetics for diabetes type 2.  Hyperlipidemia is well controlled.  Blood pressure is well controlled.  No prior history of CAD    Prior echo in 2015 showed EF of 45 to 50%.  Normal RV size and function.  No valve disease was noted.  No other cardiac tests available to review.    I have reviewed his ECG in clinic today which shows atrial fibrillation with frequent polymorphic PVCs.  Heart rate is  bpm.    Medications, personal, family, and social history reviewed with patient and revised.    PAST MEDICAL HISTORY:  Past Medical History:   Diagnosis Date     Arthritis      Chondral loose body of right knee joint      Diabetes mellitus type 2, controlled (H)      Emphysema      Hyperlipidemia LDL goal <100 5/16/2012     Hypertension 5/16/2012     Nonsenile cataract      Osteoarthritis of right knee        CURRENT MEDICATIONS:  Current Outpatient Medications   Medication Sig Dispense Refill     amLODIPine (NORVASC) 10 MG tablet Take 1 tablet (10 mg) by mouth daily 90 tablet 1     blood glucose  monitoring (ACCU-CHEK FASTCLIX) lancets 1 each 3 times daily 1 Box 5     blood glucose monitoring (ACCU-CHEK SMARTVIEW) test strip 1 strip by In Vitro route 3 times daily Or as recommended by provider 3 Month 1     budesonide-formoterol (SYMBICORT) 160-4.5 MCG/ACT Inhaler Inhale 2 puffs into the lungs 2 times daily 3 Inhaler 3     glipiZIDE (GLUCOTROL XL) 10 MG 24 hr tablet Take 2 tablets (20 mg) by mouth daily 180 tablet 1     losartan (COZAAR) 25 MG tablet Take 1 tablet (25 mg) by mouth daily 90 tablet 1     metFORMIN (GLUCOPHAGE) 1000 MG tablet Take 1 tablet (1,000 mg) by mouth 2 times daily (with meals) 180 tablet 1     metoprolol succinate ER (TOPROL-XL) 25 MG 24 hr tablet Take 1 tablet (25 mg) by mouth daily 90 tablet 3     order for DME Glucometer, brand as covered by insurance. 1 each 0     rivaroxaban ANTICOAGULANT (XARELTO) 15 MG TABS tablet Take 1 tablet (15 mg) by mouth daily (with dinner) 30 tablet 3     simvastatin (ZOCOR) 40 MG tablet Take 1 tablet (40 mg) by mouth At Bedtime 90 tablet 3       PAST SURGICAL HISTORY:  Past Surgical History:   Procedure Laterality Date     DECOMPRESSION, FUSION LUMBAR POSTERIOR ONE LEVEL, COMBINED         ALLERGIES:   No Known Allergies    FAMILY HISTORY:  Family History   Problem Relation Age of Onset     Cancer - colorectal Mother      C.A.D. Sister      Cancer Sister          SOCIAL HISTORY:  Social History     Tobacco Use     Smoking status: Former Smoker     Last attempt to quit: 1991     Years since quittin.2     Smokeless tobacco: Never Used   Substance Use Topics     Alcohol use: No     Drug use: No       ROS:   Constitutional: No fever, chills, or sweats. Weight stable.   ENT: No visual disturbance, ear ache, epistaxis, sore throat.   Cardiovascular: As per HPI.   Respiratory: No cough, hemoptysis.    GI: No nausea, vomiting, hematemesis, melena, or hematochezia.   : No hematuria.   Integument: Negative.   Psychiatric: Negative.   Hematologic:  No  easy bruising, no easy bleeding.  Neuro: Negative.   Endocrinology: No significant heat or cold intolerance   Musculoskeletal: No myalgia.    Exam:  /81 (BP Location: Right arm, Patient Position: Chair, Cuff Size: Adult Large)   Pulse 75   Wt 97.1 kg (214 lb)   SpO2 95%   BMI 32.54 kg/m    GENERAL APPEARANCE: alert and no distress  HEENT: no icterus, no central cyanosis  LYMPH/NECK: no adenopathy, no asymmetry, JVP not elevated, no carotid bruits.  RESPIRATORY: Lung sounds diminished on both sides, no use of accessory muscles, no retractions, respirations are unlabored, normal respiratory rate  CARDIOVASCULAR: irregular rhythm, normal S1, S2, no S3 or S4 and no murmur, click or rub, precordium quiet with normal PMI.  GI: soft, non tender  EXTREMITIES: peripheral pulses normal, no edema  NEURO: alert, normal speech,and affect  VASC: Radial, dorsalis pedis and posterior tibialis pulses are normal in volumes and symmetric bilaterally.   SKIN: no ecchymoses, no rashes     I have reviewed the labs and personally reviewed the imaging below and made my comment in the assessment and plan.    Labs:  CBC RESULTS:   No results found for: WBC, RBC, HGB, HCT, MCV, MCH, MCHC, RDW, PLT    BMP RESULTS:  Lab Results   Component Value Date     07/23/2019    POTASSIUM 4.8 07/23/2019    CHLORIDE 106 07/23/2019    CO2 26 07/23/2019    ANIONGAP 7 07/23/2019     (H) 07/23/2019    BUN 11 07/23/2019    CR 1.22 07/23/2019    GFRESTIMATED 54 (L) 07/23/2019    GFRESTBLACK 62 07/23/2019    RICARDA 8.6 07/23/2019      Echocardiogram 4/23/2015  Borderline left ventricular dilation is present. The Ejection Fraction is   estimated at 45-50%. Right ventricular function, chamber size, wall motion,   and thickness are normal. Pulmonary artery systolic pressure is normal. The   inferior vena cava  is normal. Ascending aorta 4 cm. No pericardial effusion   is present.    EKG 7/31/2019: Atrial fibrillation heart rate 100 bpm frequent  PVCs    EKG 2015 sinus rhythm with PVCs    Assessment and Plan:   Mr. Camilo Young is a 85 year old  male with PMH significant for COPD, DM2, HTN and mildly reduced EF 45 to 50% in 2015.     1.  Atrial fibrillation:  This is a new diagnosis. It is difficult to determine if this is paroxysmal, persistent or permanent. Prior EKG is from 2015 which shows sinus rhythm.   Detected by EKG during routine physical exam with his PCP. The patient is asymptomatic. CHADSVaSc score is 5 (age 2, diabetes 1, hypertension 1, heart failure 1).  He is recently started on rivaroxaban 15 mg due to creatinine clearance of 50.  He is also started on metoprolol 25 mg.  Patient's heart rate during physical exam today is mildly elevated around 100 bpm.  Due to history of mildly reduced LV systolic function I recommended to increase metoprolol to 50 mg daily.  Patient reports mild dizziness which might be related to amlodipine.  Recommended to decrease amlodipine to 5mg.  Continue all other current medications.      2.  Mildly reduced EF at 45 to 50% detected in 2015: The patient is euvolemic and does not have heart failure symptoms. He does not have chest pain or dyspnea on exertion. Etiology is unclear.  He has frequent PVCs. 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  The patient doesnot have prior evaluation for ischemia. Recommended a new transthoracic echocardiogram. I will review the TTE result. He will need f/u with cardiology if there is worsening in LV function.    3.  Hypothyroidism: Patient's TSH is elevated at 11.32 recently checked by his PCP on 7/23/2019.  He will follow-up with Aubrey Delgado PA-C.    4.  COPD: On inhalers.    Medication changes today: Amlodipine dose decreased from 10 to 5 mg, metoprolol dose increased from 25 to 50 mg daily.  Continue simvastatin and losartan same dose.    A total of 30 minutes spent face-toface with greater than 50% of the time spent in counseling and coordinating cares of the issues above.      Please donot hesitate to contact me if you have any questions or concerns. Again, thank you for allowing me to participate in the care of your patient.    Angelina HATHAWAY MD  [facility] Division of Cardiology  Pager [phone]

## 2019-08-02 ENCOUNTER — DOCUMENTATION ONLY (OUTPATIENT)
Dept: LAB | Facility: CLINIC | Age: 84
End: 2019-08-02

## 2019-08-02 DIAGNOSIS — Z86.39 HISTORY OF ELEVATED GLUCOSE: Primary | ICD-10-CM

## 2019-08-02 DIAGNOSIS — Z13.1 SCREENING FOR DIABETES MELLITUS: ICD-10-CM

## 2019-08-02 DIAGNOSIS — R53.83 FATIGUE, UNSPECIFIED TYPE: ICD-10-CM

## 2019-08-02 DIAGNOSIS — R79.89 ELEVATED TSH: ICD-10-CM

## 2019-08-02 LAB
GLUCOSE SERPL-MCNC: 317 MG/DL (ref 70–99)
T3 SERPL-MCNC: 83 NG/DL (ref 60–181)
T4 FREE SERPL-MCNC: 0.96 NG/DL (ref 0.76–1.46)

## 2019-08-02 PROCEDURE — 86376 MICROSOMAL ANTIBODY EACH: CPT | Performed by: PHYSICIAN ASSISTANT

## 2019-08-02 PROCEDURE — 84439 ASSAY OF FREE THYROXINE: CPT | Performed by: PHYSICIAN ASSISTANT

## 2019-08-02 PROCEDURE — 84480 ASSAY TRIIODOTHYRONINE (T3): CPT | Performed by: PHYSICIAN ASSISTANT

## 2019-08-02 PROCEDURE — 82947 ASSAY GLUCOSE BLOOD QUANT: CPT | Performed by: PHYSICIAN ASSISTANT

## 2019-08-02 PROCEDURE — 36415 COLL VENOUS BLD VENIPUNCTURE: CPT | Performed by: PHYSICIAN ASSISTANT

## 2019-08-02 NOTE — LETTER
August 28, 2019      Camilo Young  [address] [place] [address]        Camilo,    Your recent thyroid function evaluation came back normal.       Resulted Orders   T3, total   Result Value Ref Range    Triiodothyronine (T3) 83 60 - 181 ng/dL   Thyroid peroxidase antibody   Result Value Ref Range    Thyroid Peroxidase Antibody <10 <35 IU/mL   T4, free   Result Value Ref Range    T4 Free 0.96 0.76 - 1.46 ng/dL   **Glucose FUTURE anytime   Result Value Ref Range    Glucose 317 (H) 70 - 99 mg/dL       If you have any questions or concerns, please call the clinic at the number listed above.       Sincerely,      Aubrey Delgado PA-C/vielka

## 2019-08-02 NOTE — PROGRESS NOTES
Patient was in for a lab only visit and was asking to get his glucose checked because he has been running high at home. Please sign pending order if needed.  Thanks,  Corrina

## 2019-08-05 LAB — THYROPEROXIDASE AB SERPL-ACNC: <10 IU/ML

## 2019-08-09 ENCOUNTER — ANCILLARY PROCEDURE (OUTPATIENT)
Dept: CARDIOLOGY | Facility: CLINIC | Age: 84
End: 2019-08-09
Attending: INTERNAL MEDICINE
Payer: COMMERCIAL

## 2019-08-09 DIAGNOSIS — I48.21 PERMANENT ATRIAL FIBRILLATION (H): ICD-10-CM

## 2019-08-09 PROCEDURE — 93306 TTE W/DOPPLER COMPLETE: CPT | Performed by: INTERNAL MEDICINE

## 2019-08-13 ENCOUNTER — TELEPHONE (OUTPATIENT)
Dept: CARDIOLOGY | Facility: CLINIC | Age: 84
End: 2019-08-13

## 2019-08-13 NOTE — TELEPHONE ENCOUNTER
Please call patient back with results.  Karen Meyers RN      Notes recorded by Angelina Mcgowan MD on 8/9/2019 at 5:48 PM CDT  Camilo,    Good news. Your heart function is stable compared to 2015.     Please continue your current medications.  Let us know if you have questions or symptoms.

## 2019-08-13 NOTE — TELEPHONE ENCOUNTER
[facility]    Phone Message    May a detailed message be left on voicemail: yes    Reason for Call: Other: Camilo returning call. Please call him back to discuss.      Action Taken: Message routed to:  [facility] ([facility] fk heart

## 2019-08-14 NOTE — TELEPHONE ENCOUNTER
Left message for patient stating letter with results have been mailed and no urgency.  Karen Meyers RN    Notes recorded by Kami Brunson CMA on 8/12/2019 at 2:29 PM CDT  Results letter sent to HIM for mailing. TEJINDER Tejada

## 2019-10-22 DIAGNOSIS — N18.30 TYPE 2 DIABETES MELLITUS WITH STAGE 3 CHRONIC KIDNEY DISEASE, WITHOUT LONG-TERM CURRENT USE OF INSULIN (H): Primary | ICD-10-CM

## 2019-10-22 DIAGNOSIS — E11.9 DIABETES MELLITUS WITHOUT COMPLICATION (H): ICD-10-CM

## 2019-10-22 DIAGNOSIS — Z79.4 TYPE 2 DIABETES MELLITUS WITHOUT COMPLICATION, WITH LONG-TERM CURRENT USE OF INSULIN (H): ICD-10-CM

## 2019-10-22 DIAGNOSIS — E11.9 TYPE 2 DIABETES MELLITUS WITHOUT COMPLICATION, WITH LONG-TERM CURRENT USE OF INSULIN (H): ICD-10-CM

## 2019-10-22 DIAGNOSIS — E11.42 TYPE 2 DIABETES MELLITUS WITH DIABETIC POLYNEUROPATHY, WITHOUT LONG-TERM CURRENT USE OF INSULIN (H): ICD-10-CM

## 2019-10-22 DIAGNOSIS — E11.22 TYPE 2 DIABETES MELLITUS WITH STAGE 3 CHRONIC KIDNEY DISEASE, WITHOUT LONG-TERM CURRENT USE OF INSULIN (H): Primary | ICD-10-CM

## 2019-10-22 LAB
ANION GAP SERPL CALCULATED.3IONS-SCNC: 5 MMOL/L (ref 3–14)
BUN SERPL-MCNC: 17 MG/DL (ref 7–30)
CALCIUM SERPL-MCNC: 8.3 MG/DL (ref 8.5–10.1)
CHLORIDE SERPL-SCNC: 108 MMOL/L (ref 94–109)
CO2 SERPL-SCNC: 28 MMOL/L (ref 20–32)
CREAT SERPL-MCNC: 1.19 MG/DL (ref 0.66–1.25)
GFR SERPL CREATININE-BSD FRML MDRD: 55 ML/MIN/{1.73_M2}
GLUCOSE SERPL-MCNC: 133 MG/DL (ref 70–99)
HBA1C MFR BLD: 7.4 % (ref 0–5.6)
POTASSIUM SERPL-SCNC: 4.5 MMOL/L (ref 3.4–5.3)
SODIUM SERPL-SCNC: 141 MMOL/L (ref 133–144)

## 2019-10-22 PROCEDURE — 83036 HEMOGLOBIN GLYCOSYLATED A1C: CPT | Performed by: PHYSICIAN ASSISTANT

## 2019-10-22 PROCEDURE — 82043 UR ALBUMIN QUANTITATIVE: CPT | Performed by: PHYSICIAN ASSISTANT

## 2019-10-22 PROCEDURE — 36415 COLL VENOUS BLD VENIPUNCTURE: CPT | Performed by: PHYSICIAN ASSISTANT

## 2019-10-22 PROCEDURE — 80048 BASIC METABOLIC PNL TOTAL CA: CPT | Performed by: PHYSICIAN ASSISTANT

## 2019-10-23 LAB
CREAT UR-MCNC: 55 MG/DL
MICROALBUMIN UR-MCNC: 27 MG/L
MICROALBUMIN/CREAT UR: 49.36 MG/G CR (ref 0–17)

## 2019-11-05 NOTE — PROGRESS NOTES
Subjective     Camilo Young is a 85 year old male who presents to clinic today for the following health issues:    HPI   Diabetes Follow-up    How often are you checking your blood sugar? One time daily  What time of day are you checking your blood sugars (select all that apply)?  Before meals  Have you had any blood sugars above 200?  No  Have you had any blood sugars below 70?  NO    What symptoms do you notice when your blood sugar is low?  Lethargy and Confusion    What concerns do you have today about your diabetes? None     Do you have any of these symptoms? (Select all that apply)  No numbness or tingling in feet.  No redness, sores or blisters on feet.  No complaints of excessive thirst.  No reports of blurry vision.  No significant changes to weight.     Have you had a diabetic eye exam in the last 12 months? No    BP Readings from Last 2 Encounters:   11/06/19 120/67   07/31/19 129/81     Hemoglobin A1C (%)   Date Value   10/22/2019 7.4 (H)   07/23/2019 8.9 (H)     LDL Cholesterol Calculated (mg/dL)   Date Value   07/23/2019 77   04/24/2018 51       Diabetes Management Resources    Hypertension Follow-up      Do you check your blood pressure regularly outside of the clinic? No     Are you following a low salt diet? No      How many servings of fruits and vegetables do you eat daily?  0-1    On average, how many sweetened beverages do you drink each day (soda, juice, sweet tea, etc)?   2    How many days per week do you miss taking your medication? 0        Patient Active Problem List   Diagnosis     COPD (chronic obstructive pulmonary disease) (H)     Obesity     Advanced directives, counseling/discussion     Cataract     H/O: CVA (cerebrovascular accident)     CTS (carpal tunnel syndrome)     Primary osteoarthritis of left knee     MMT (medial meniscus tear)     Hyperlipidemia LDL goal <70     Essential hypertension with goal blood pressure less than 140/90     Chronic atrial fibrillation     Type 2  diabetes mellitus with stage 3 chronic kidney disease, without long-term current use of insulin (H)     Decreased pulses in feet     Type 2 diabetes mellitus with diabetic polyneuropathy, without long-term current use of insulin (H)     Past Surgical History:   Procedure Laterality Date     DECOMPRESSION, FUSION LUMBAR POSTERIOR ONE LEVEL, COMBINED         Social History     Tobacco Use     Smoking status: Former Smoker     Packs/day: 2.00     Years: 40.00     Pack years: 80.00     Last attempt to quit: 1991     Years since quittin.4     Smokeless tobacco: Never Used   Substance Use Topics     Alcohol use: No     Family History   Problem Relation Age of Onset     Cancer - colorectal Mother      C.A.D. Sister      Cancer Sister          Current Outpatient Medications   Medication Sig Dispense Refill     ACCU-CHEK SMARTVIEW test strip USE TO TEST BLOOD SUGAR 3 TIMES DAILY OR AS RECOMMENDED BY PROVIDER 250 each 0     amLODIPine (NORVASC) 5 MG tablet Take 1 tablet (5 mg) by mouth daily 90 tablet 3     blood glucose monitoring (ACCU-CHEK FASTCLIX) lancets 1 each 3 times daily 1 Box 5     budesonide-formoterol (SYMBICORT) 160-4.5 MCG/ACT Inhaler Inhale 2 puffs into the lungs 2 times daily 3 Inhaler 3     glipiZIDE (GLUCOTROL XL) 10 MG 24 hr tablet Take 2 tablets (20 mg) by mouth daily 180 tablet 1     losartan (COZAAR) 25 MG tablet Take 1 tablet (25 mg) by mouth daily 90 tablet 1     metFORMIN (GLUCOPHAGE) 1000 MG tablet Take 1 tablet (1,000 mg) by mouth 2 times daily (with meals) 180 tablet 1     metoprolol succinate ER (TOPROL-XL) 50 MG 24 hr tablet Take 1 tablet (50 mg) by mouth daily 90 tablet 3     order for DME Glucometer, brand as covered by insurance. 1 each 0     rivaroxaban ANTICOAGULANT (XARELTO) 15 MG TABS tablet Take 1 tablet (15 mg) by mouth daily (with dinner) 30 tablet 3     simvastatin (ZOCOR) 40 MG tablet Take 1 tablet (40 mg) by mouth At Bedtime 90 tablet 3     No Known Allergies  Recent Labs  "  Lab Test 10/22/19  0840 07/23/19  0752 09/21/18  0944 04/24/18  0934  10/03/17  1103 03/17/17  1042   A1C 7.4* 8.9* 8.5* 7.9*   < > 8.1* 7.4*   LDL  --  77  --  51  --   --  75   HDL  --  55  --  50  --   --  51   TRIG  --  78  --  90  --   --  99   CR 1.19 1.22  --  1.11   < >  --  1.32*   GFRESTIMATED 55* 54*  --  63   < >  --  52*   GFRESTBLACK 64 62  --  76   < >  --  63   POTASSIUM 4.5 4.8  --  4.6   < >  --  4.6   TSH  --  11.32*  --   --   --  4.90* 4.57*    < > = values in this interval not displayed.      BP Readings from Last 3 Encounters:   11/06/19 120/67   07/31/19 129/81   07/23/19 133/75    Wt Readings from Last 3 Encounters:   11/06/19 95.7 kg (211 lb)   07/31/19 97.1 kg (214 lb)   07/23/19 98 kg (216 lb)                chad2 vasc score of 6 points places him in the moderately high risk for an embolic event. Will continue blood thinners.    Copd is stable. symbicort seems to help a lot .      Reviewed and updated as needed this visit by Provider         Review of Systems   ROS COMP: Constitutional, HEENT, cardiovascular, pulmonary, GI, , musculoskeletal, neuro, skin, endocrine and psych systems are negative, except as otherwise noted.      Objective    /67   Pulse 80   Temp 97  F (36.1  C) (Tympanic)   Resp 16   Ht 1.727 m (5' 8\")   Wt 95.7 kg (211 lb)   SpO2 98%   BMI 32.08 kg/m    Body mass index is 32.08 kg/m .  Physical Exam   Eye exam - right eye normal lid, conjunctiva, cornea, pupil and fundus, left eye normal lid, conjunctiva, cornea, pupil and fundus.  Thyroid not palpable, not enlarged, no nodules detected.  CHEST:chest clear to IPPA, no tachypnea, retractions or cyanosis and Heart exam detail:irregularly irregular rhythm, chest is clear without rales or wheezing, no pedal edema, no JVD, no hepatosplenomegaly  .Examination of the feet reveals no trophic changes or ulcerative lesions, normal monofilament exam, DP reduced bilateral and PT reduced bilateral. Mild dec in " monofilament tactile sensation. .  .    work on lifestyle modification  Recheck in 6 mos

## 2019-11-06 ENCOUNTER — OFFICE VISIT (OUTPATIENT)
Dept: FAMILY MEDICINE | Facility: CLINIC | Age: 84
End: 2019-11-06
Payer: COMMERCIAL

## 2019-11-06 VITALS
RESPIRATION RATE: 16 BRPM | BODY MASS INDEX: 31.98 KG/M2 | SYSTOLIC BLOOD PRESSURE: 120 MMHG | DIASTOLIC BLOOD PRESSURE: 67 MMHG | OXYGEN SATURATION: 98 % | HEART RATE: 80 BPM | HEIGHT: 68 IN | WEIGHT: 211 LBS | TEMPERATURE: 97 F

## 2019-11-06 DIAGNOSIS — E11.22 TYPE 2 DIABETES MELLITUS WITH STAGE 3 CHRONIC KIDNEY DISEASE, WITHOUT LONG-TERM CURRENT USE OF INSULIN (H): Primary | ICD-10-CM

## 2019-11-06 DIAGNOSIS — N18.30 TYPE 2 DIABETES MELLITUS WITH STAGE 3 CHRONIC KIDNEY DISEASE, WITHOUT LONG-TERM CURRENT USE OF INSULIN (H): Primary | ICD-10-CM

## 2019-11-06 DIAGNOSIS — I48.91 ATRIAL FIBRILLATION, UNSPECIFIED TYPE (H): ICD-10-CM

## 2019-11-06 PROCEDURE — 99214 OFFICE O/P EST MOD 30 MIN: CPT | Performed by: PHYSICIAN ASSISTANT

## 2019-11-06 ASSESSMENT — MIFFLIN-ST. JEOR: SCORE: 1616.59

## 2020-02-03 DIAGNOSIS — N18.30 TYPE 2 DIABETES MELLITUS WITH STAGE 3 CHRONIC KIDNEY DISEASE, WITHOUT LONG-TERM CURRENT USE OF INSULIN (H): ICD-10-CM

## 2020-02-03 DIAGNOSIS — E11.22 TYPE 2 DIABETES MELLITUS WITH STAGE 3 CHRONIC KIDNEY DISEASE, WITHOUT LONG-TERM CURRENT USE OF INSULIN (H): ICD-10-CM

## 2020-02-03 NOTE — TELEPHONE ENCOUNTER
"Requested Prescriptions   Pending Prescriptions Disp Refills     glipiZIDE (GLUCOTROL XL) 10 MG 24 hr tablet [Pharmacy Med Name: glipiZIDE ER Oral Tablet Extended Release 24 Hour 10 MG] 180 tablet 0     Sig: Take 2 tablets (20 mg) by mouth daily  Last Written Prescription Date:  2/3/20  Last Fill Quantity: 180,  # refills: 0  Last office visit: 11/6/2019 with prescribing provider:  EFREM Delgado   Future Office Visit:         Sulfonylurea Agents Passed - 2/3/2020  4:00 PM        Passed - Blood pressure less than 140/90 in past 6 months     BP Readings from Last 3 Encounters:   11/06/19 120/67   07/31/19 129/81   07/23/19 133/75                 Passed - Patient has documented LDL within the past 12 mos.     Recent Labs   Lab Test 07/23/19  0752   LDL 77             Passed - Patient has had a Microalbumin in the past 15 mos.     Recent Labs   Lab Test 10/22/19  1135   MICROL 27   UMALCR 49.36*             Passed - Patient has documented A1c within the specified period of time.     If HgbA1C is 8 or greater, it needs to be on file within the past 3 months.  If less than 8, must be on file within the past 6 months.     Recent Labs   Lab Test 10/22/19  0840   A1C 7.4*             Passed - Medication is active on med list        Passed - Patient is age 18 or older        Passed - Patient has a recent creatinine (normal) within the past 12 mos.     Recent Labs   Lab Test 10/22/19  0840   CR 1.19             Passed - Recent (6 mo) or future (30 days) visit within the authorizing provider's specialty     Patient had office visit in the last 6 months or has a visit in the next 30 days with authorizing provider or within the authorizing provider's specialty.  See \"Patient Info\" tab in inbasket, or \"Choose Columns\" in Meds & Orders section of the refill encounter.            "

## 2020-02-04 RX ORDER — GLIPIZIDE 10 MG/1
TABLET, FILM COATED, EXTENDED RELEASE ORAL
Qty: 180 TABLET | Refills: 0 | Status: SHIPPED | OUTPATIENT
Start: 2020-02-04 | End: 2020-06-07

## 2020-02-07 DIAGNOSIS — N18.30 TYPE 2 DIABETES MELLITUS WITH STAGE 3 CHRONIC KIDNEY DISEASE, WITHOUT LONG-TERM CURRENT USE OF INSULIN (H): ICD-10-CM

## 2020-02-07 DIAGNOSIS — E11.22 TYPE 2 DIABETES MELLITUS WITH STAGE 3 CHRONIC KIDNEY DISEASE, WITHOUT LONG-TERM CURRENT USE OF INSULIN (H): ICD-10-CM

## 2020-02-08 NOTE — TELEPHONE ENCOUNTER
"Requested Prescriptions   Pending Prescriptions Disp Refills     metFORMIN (GLUCOPHAGE) 1000 MG tablet [Pharmacy Med Name: metFORMIN HCl Oral Tablet 1000 MG]  Last Written Prescription Date:  07/23/19  Last Fill Quantity: 180,  # refills: 1   Last office visit: 11/6/2019 with prescribing provider:  BRENNON Delgado   Future Office Visit:     180 tablet 0     Sig: Take 1 tablet (1,000 mg) by mouth 2 times daily (with meals)       Biguanide Agents Passed - 2/7/2020 10:24 PM        Passed - Blood pressure less than 140/90 in past 6 months     BP Readings from Last 3 Encounters:   11/06/19 120/67   07/31/19 129/81   07/23/19 133/75                 Passed - Patient has documented LDL within the past 12 mos.     Recent Labs   Lab Test 07/23/19  0752   LDL 77             Passed - Patient has had a Microalbumin in the past 15 mos.     Recent Labs   Lab Test 10/22/19  1135   MICROL 27   UMALCR 49.36*             Passed - Patient is age 10 or older        Passed - Patient has documented A1c within the specified period of time.     If HgbA1C is 8 or greater, it needs to be on file within the past 3 months.  If less than 8, must be on file within the past 6 months.     Recent Labs   Lab Test 10/22/19  0840   A1C 7.4*             Passed - Patient's CR is NOT>1.4 OR Patient's EGFR is NOT<45 within past 12 mos.     Recent Labs   Lab Test 10/22/19  0840   GFRESTIMATED 55*   GFRESTBLACK 64       Recent Labs   Lab Test 10/22/19  0840   CR 1.19             Passed - Patient does NOT have a diagnosis of CHF.        Passed - Medication is active on med list        Passed - Recent (6 mo) or future (30 days) visit within the authorizing provider's specialty     Patient had office visit in the last 6 months or has a visit in the next 30 days with authorizing provider or within the authorizing provider's specialty.  See \"Patient Info\" tab in inbasket, or \"Choose Columns\" in Meds & Orders section of the refill encounter.              "

## 2020-02-10 DIAGNOSIS — J41.8 MIXED SIMPLE AND MUCOPURULENT CHRONIC BRONCHITIS (H): ICD-10-CM

## 2020-02-10 DIAGNOSIS — I10 HYPERTENSION GOAL BP (BLOOD PRESSURE) < 140/90: ICD-10-CM

## 2020-02-10 NOTE — TELEPHONE ENCOUNTER
"Requested Prescriptions   Pending Prescriptions Disp Refills     losartan (COZAAR) 25 MG tablet [Pharmacy Med Name: Losartan Potassium Oral Tablet 25 MG] 90 tablet 0     Sig: Take 1 tablet (25 mg) by mouth daily   Last Written Prescription Date:  2-10-20  Last Fill Quantity: 90,  # refills: 0   Last office visit: 11/6/2019 with prescribing provider:  11-6-19   Future Office Visit:      Angiotensin-II Receptors Passed - 2/10/2020 12:42 PM        Passed - Last blood pressure under 140/90 in past 12 months     BP Readings from Last 3 Encounters:   11/06/19 120/67   07/31/19 129/81   07/23/19 133/75                 Passed - Recent (12 mo) or future (30 days) visit within the authorizing provider's specialty     Patient has had an office visit with the authorizing provider or a provider within the authorizing providers department within the previous 12 mos or has a future within next 30 days. See \"Patient Info\" tab in inbasket, or \"Choose Columns\" in Meds & Orders section of the refill encounter.              Passed - Medication is active on med list        Passed - Patient is age 18 or older        Passed - Normal serum creatinine on file in past 12 months     Recent Labs   Lab Test 10/22/19  0840   CR 1.19             Passed - Normal serum potassium on file in past 12 months     Recent Labs   Lab Test 10/22/19  0840   POTASSIUM 4.5                    SYMBICORT 160-4.5 MCG/ACT Inhaler [Pharmacy Med Name: Symbicort Inhalation Aerosol 160-4.5 MCG/ACT] 10.2 g 2     Sig: INHALE 2 PUFFS BY MOUTH INTO THE LUNGS 2 TIMES DAILY   Last Written Prescription Date:  2-10-20  Last Fill Quantity: 10.2,  # refills: 2   Last office visit: 11/6/2019 with prescribing provider:  11-6-19   Future Office Visit:      Inhaled Steroids Protocol Passed - 2/10/2020 12:42 PM        Passed - Patient is age 12 or older        Passed - Recent (12 mo) or future (30 days) visit within the authorizing provider's specialty     Patient has had an office " "visit with the authorizing provider or a provider within the authorizing providers department within the previous 12 mos or has a future within next 30 days. See \"Patient Info\" tab in inbasket, or \"Choose Columns\" in Meds & Orders section of the refill encounter.              Passed - Medication is active on med list        "

## 2020-02-11 RX ORDER — BUDESONIDE AND FORMOTEROL FUMARATE DIHYDRATE 160; 4.5 UG/1; UG/1
AEROSOL RESPIRATORY (INHALATION)
Qty: 10.2 G | Refills: 1 | Status: SHIPPED | OUTPATIENT
Start: 2020-02-11 | End: 2020-10-27

## 2020-02-11 RX ORDER — LOSARTAN POTASSIUM 25 MG/1
25 TABLET ORAL DAILY
Qty: 90 TABLET | Refills: 0 | Status: SHIPPED | OUTPATIENT
Start: 2020-02-11 | End: 2020-06-03

## 2020-03-02 ENCOUNTER — ANCILLARY PROCEDURE (OUTPATIENT)
Dept: GENERAL RADIOLOGY | Facility: CLINIC | Age: 85
End: 2020-03-02
Attending: PEDIATRICS
Payer: COMMERCIAL

## 2020-03-02 ENCOUNTER — OFFICE VISIT (OUTPATIENT)
Dept: ORTHOPEDICS | Facility: CLINIC | Age: 85
End: 2020-03-02
Payer: COMMERCIAL

## 2020-03-02 VITALS
WEIGHT: 222 LBS | DIASTOLIC BLOOD PRESSURE: 78 MMHG | BODY MASS INDEX: 33.65 KG/M2 | SYSTOLIC BLOOD PRESSURE: 140 MMHG | HEIGHT: 68 IN

## 2020-03-02 DIAGNOSIS — M54.50 ACUTE RIGHT-SIDED LOW BACK PAIN: ICD-10-CM

## 2020-03-02 DIAGNOSIS — M54.50 ACUTE RIGHT-SIDED LOW BACK PAIN WITHOUT SCIATICA: Primary | ICD-10-CM

## 2020-03-02 DIAGNOSIS — M47.816 LUMBAR SPONDYLOSIS: ICD-10-CM

## 2020-03-02 PROCEDURE — 72100 X-RAY EXAM L-S SPINE 2/3 VWS: CPT

## 2020-03-02 PROCEDURE — 99204 OFFICE O/P NEW MOD 45 MIN: CPT | Performed by: PEDIATRICS

## 2020-03-02 ASSESSMENT — MIFFLIN-ST. JEOR: SCORE: 1661.49

## 2020-03-02 NOTE — PATIENT INSTRUCTIONS
X-rays show some wear and tear, or arthritis  Topical Treatments: Ice, Heat or Topical Analgesics as needed  Over the counter medication: Acetaminophen (Tylenol) 500-1000 mg every 6 hours (Maximum of 3000 mg/day); may use routinely for 1-2 weeks  Prescription Medication: you already have a prescription for hydrocodone-acetaminophen   Keep in mind the total dose of the acetaminophen per day    Will monitor over the next few days  Safe to do procedure tomorrow  Contact clinic if any questions or concerns  Future considerations may include physical therapy or other imaging (for example, MRI)

## 2020-03-02 NOTE — PROGRESS NOTES
Sports Medicine Clinic Visit    PCP: Aubrey Delgado    Camilo Young is a 86 year old male who is seen  as a self referral AIC presenting with low back pain    Injury: no BRISA  **  Points over right low back, and posterior hip; over level of SI joint.  Feels difficult to stand.  A little right thigh soreness today, but not sure whether related to low back.    Has been less active lately overall; otherwise no change in activities.    Tomorrow supposed to have right eye surgery. Was advised that can still proceed with procedure.      Location of Pain: right sided low back pain  Duration of Pain: 3 day(s)  Rating of Pain at worst: 9/10  Rating of Pain Currently: 5/10  Symptoms are better with: Sitting  Symptoms are worse with: walking  Additional Features:   Positive: pain, numbness   Negative: swelling, bruising, popping, grinding, catching, locking, instability, paresthesias, weakness and pain in other joints  Other evaluation and/or treatments so far consists of: went to  today and they gave him 4 tablets of Norco, having cataract surgery tomorrow for right eye  Prior History of related problems: hx of back pain but would go away, back surgery - fusion  60 yrs ago  Social History: retired     Camilo was asked to complete the Oswestry Low Back Disability Index and Joe Start Back screening tool.  today in the office.  Disability score: 44.44%.     Review of Systems  Musculoskeletal: as above  Remainder of review of systems is negative including constitutional, CV, pulmonary, GI, Skin and Neurologic except as noted in HPI or medical history.    Past Medical History:   Diagnosis Date     Arthritis      Chondral loose body of right knee joint      Diabetes mellitus type 2, controlled (H)      Emphysema      Hyperlipidemia LDL goal <100 5/16/2012     Hypertension 5/16/2012     Nonsenile cataract      Osteoarthritis of right knee      Past Surgical History:   Procedure Laterality Date      "DECOMPRESSION, FUSION LUMBAR POSTERIOR ONE LEVEL, COMBINED       Family History   Problem Relation Age of Onset     Cancer - colorectal Mother      C.A.D. Sister      Cancer Sister      Social History     Socioeconomic History     Marital status:      Spouse name: Not on file     Number of children: Not on file     Years of education: Not on file     Highest education level: Not on file   Occupational History     Not on file   Social Needs     Financial resource strain: Not on file     Food insecurity:     Worry: Not on file     Inability: Not on file     Transportation needs:     Medical: Not on file     Non-medical: Not on file   Tobacco Use     Smoking status: Former Smoker     Packs/day: 2.00     Years: 40.00     Pack years: 80.00     Last attempt to quit: 1991     Years since quittin.8     Smokeless tobacco: Never Used   Substance and Sexual Activity     Alcohol use: No     Drug use: No     Sexual activity: Never   Lifestyle     Physical activity:     Days per week: Not on file     Minutes per session: Not on file     Stress: Not on file   Relationships     Social connections:     Talks on phone: Not on file     Gets together: Not on file     Attends Cheondoism service: Not on file     Active member of club or organization: Not on file     Attends meetings of clubs or organizations: Not on file     Relationship status: Not on file     Intimate partner violence:     Fear of current or ex partner: Not on file     Emotionally abused: Not on file     Physically abused: Not on file     Forced sexual activity: Not on file   Other Topics Concern     Parent/sibling w/ CABG, MI or angioplasty before 65F 55M? Not Asked   Social History Narrative     Not on file       Objective  BP (!) 140/78 (BP Location: Left arm, Patient Position: Chair)   Ht 1.727 m (5' 8\")   Wt 100.7 kg (222 lb)   BMI 33.75 kg/m      GENERAL APPEARANCE: healthy, alert and no distress   GAIT: cane  SKIN: no suspicious lesions or " rashes  NEURO: Normal strength and tone, mentation intact and speech normal  PSYCH:  mentation appears normal and affect normal/bright  HEENT: no scleral icterus  CV: distal perfusion intact  RESP: nonlabored breathing      Low back exam:    Inspection:       no visible deformity in the low back       normal skin       normal vascular       normal lymphatic    ROM:       limited flexion, stiffness; hands to knees       limited extension, stiffness; mod limitation       Lateral bending mod limitation; some pain on right with rightward bending    Tender:       Over right lumbar paraspinals mild  Mild over right sacrum    Non Tender:       remainder of lumbar spine       SI joints    Strength:       hip flexion        knee extension        ankle dorsiflexion        ankle plantarflexion        dorsiflexion of the great toe   Grossly full    Reflexes:       patellar (L3, L4) symmetric        achilles tendons (S1) symmetric   intact    Skin:       well perfused       capillary refill brisk    Special tests:       straight leg raise left not done, does not want to lie supine        straight leg raise right not done, does not want to lie supine       unable TOM, does not want to lie supine        slump test no change       Radiology:  Visualized radiographs of lumbar spine obtained today, and reviewed the images with the patient.  Impression: multilevel degenerative change including facet arthrosis    XR Lumbar Spine 2-3 Views*    Narrative    LUMBAR SPINE TWO TO THREE VIEWS 3/2/2020 11:41 AM     COMPARISON: None.    HISTORY: Acute right-sided low back pain.      Impression    IMPRESSION: There is normal alignment of the lumbar vertebrae.  Vertebral body heights of the lumbar spine are normal. There is no  evidence for fracture of the lumbar spine. There is severe facet  arthropathy at the L3-L4, L4-L5 and L5-S1 levels.    JAVIER SAENZ MD         Assessment:  1. Acute right-sided low back pain without sciatica    2.  Lumbar spondylosis        Plan:  Discussed the assessment with the patient. Favor facet arthrosis as source. No acute bony abnormality noted on x-ray.  We discussed the following: symptom treatment, activity modification/rest, imaging, rehab, medication and potential for improvement with time. Following discussion, plan:  See patient instructions. He primarily prefers to monitor, with use of acetaminophen.  Acetaminophen use reviewed. Also already has rx for hydrocodone-acetaminophen from , but has not yet filled. He plans to do so.  X-ray of the lumbar spine reviewed. No clear indication to obtain advanced imaging currently.  Activity Modification: reviewed  Rehab: Physical Therapy: future consideration  Primarily wants to monitor for now. I think he is safe to do eye procedure tomorrow. He indicated he has already discussed this with his surgeon as well.  Follow up: will leave open ended. Monitor next ~1 week to start.  We discussed potentially concerning signs and symptoms related to the condition(s) listed above, including increase in pain, changing pain, neurologic symptoms, and the patient was instructed to seek appropriate medical care if noted. All questions answered to patient's satisfaction. The patient expressed understanding of the plan.     Jose Luis Orourke, DO, CAQ            Patient Instructions   X-rays show some wear and tear, or arthritis  Topical Treatments: Ice, Heat or Topical Analgesics as needed  Over the counter medication: Acetaminophen (Tylenol) 500-1000 mg every 6 hours (Maximum of 3000 mg/day); may use routinely for 1-2 weeks  Prescription Medication: you already have a prescription for hydrocodone-acetaminophen   Keep in mind the total dose of the acetaminophen per day    Will monitor over the next few days  Safe to do procedure tomorrow  Contact clinic if any questions or concerns  Future considerations may include physical therapy or other imaging (for example,  MRI)        Disclaimer: This note consists of symbols derived from keyboarding, dictation and/or voice recognition software. As a result, there may be errors in the script that have gone undetected. Please consider this when interpreting information found in this chart.

## 2020-03-02 NOTE — LETTER
3/2/2020         RE: Camilo Young  [address]  [place] [address]        Dear Colleague,    Thank you for referring your patient, Camilo Young, to the [facility]. Please see a copy of my visit note below.    Sports Medicine Clinic Visit    PCP: Aubrey Delgado    Camilo Young is a 86 year old male who is seen  as a self referral AIC presenting with low back pain    Injury: no BRISA  **  Points over right low back, and posterior hip; over level of SI joint.  Feels difficult to stand.  A little right thigh soreness today, but not sure whether related to low back.    Has been less active lately overall; otherwise no change in activities.    Tomorrow supposed to have right eye surgery. Was advised that can still proceed with procedure.      Location of Pain: right sided low back pain  Duration of Pain: 3 day(s)  Rating of Pain at worst: 9/10  Rating of Pain Currently: 5/10  Symptoms are better with: Sitting  Symptoms are worse with: walking  Additional Features:   Positive: pain, numbness   Negative: swelling, bruising, popping, grinding, catching, locking, instability, paresthesias, weakness and pain in other joints  Other evaluation and/or treatments so far consists of: went to  today and they gave him 4 tablets of Norco, having cataract surgery tomorrow for right eye  Prior History of related problems: hx of back pain but would go away, back surgery - fusion  60 yrs ago  Social History: retired     Camilo was asked to complete the Oswestry Low Back Disability Index and Joe Start Back screening tool.  today in the office.  Disability score: 44.44%.     Review of Systems  Musculoskeletal: as above  Remainder of review of systems is negative including constitutional, CV, pulmonary, GI, Skin and Neurologic except as noted in HPI or medical history.    Past Medical History:   Diagnosis Date     Arthritis      Chondral loose body of right knee joint       Diabetes mellitus type 2, controlled (H)      Emphysema      Hyperlipidemia LDL goal <100 2012     Hypertension 2012     Nonsenile cataract      Osteoarthritis of right knee      Past Surgical History:   Procedure Laterality Date     DECOMPRESSION, FUSION LUMBAR POSTERIOR ONE LEVEL, COMBINED       Family History   Problem Relation Age of Onset     Cancer - colorectal Mother      C.A.D. Sister      Cancer Sister      Social History     Socioeconomic History     Marital status:      Spouse name: Not on file     Number of children: Not on file     Years of education: Not on file     Highest education level: Not on file   Occupational History     Not on file   Social Needs     Financial resource strain: Not on file     Food insecurity:     Worry: Not on file     Inability: Not on file     Transportation needs:     Medical: Not on file     Non-medical: Not on file   Tobacco Use     Smoking status: Former Smoker     Packs/day: 2.00     Years: 40.00     Pack years: 80.00     Last attempt to quit: 1991     Years since quittin.8     Smokeless tobacco: Never Used   Substance and Sexual Activity     Alcohol use: No     Drug use: No     Sexual activity: Never   Lifestyle     Physical activity:     Days per week: Not on file     Minutes per session: Not on file     Stress: Not on file   Relationships     Social connections:     Talks on phone: Not on file     Gets together: Not on file     Attends Christianity service: Not on file     Active member of club or organization: Not on file     Attends meetings of clubs or organizations: Not on file     Relationship status: Not on file     Intimate partner violence:     Fear of current or ex partner: Not on file     Emotionally abused: Not on file     Physically abused: Not on file     Forced sexual activity: Not on file   Other Topics Concern     Parent/sibling w/ CABG, MI or angioplasty before 65F 55M? Not Asked   Social History Narrative     Not on file  "      Objective  BP (!) 140/78 (BP Location: Left arm, Patient Position: Chair)   Ht 1.727 m (5' 8\")   Wt 100.7 kg (222 lb)   BMI 33.75 kg/m       GENERAL APPEARANCE: healthy, alert and no distress   GAIT: cane  SKIN: no suspicious lesions or rashes  NEURO: Normal strength and tone, mentation intact and speech normal  PSYCH:  mentation appears normal and affect normal/bright  HEENT: no scleral icterus  CV: distal perfusion intact  RESP: nonlabored breathing      Low back exam:    Inspection:       no visible deformity in the low back       normal skin       normal vascular       normal lymphatic    ROM:       limited flexion, stiffness; hands to knees       limited extension, stiffness; mod limitation       Lateral bending mod limitation; some pain on right with rightward bending    Tender:       Over right lumbar paraspinals mild  Mild over right sacrum    Non Tender:       remainder of lumbar spine       SI joints    Strength:       hip flexion        knee extension        ankle dorsiflexion        ankle plantarflexion        dorsiflexion of the great toe   Grossly full    Reflexes:       patellar (L3, L4) symmetric        achilles tendons (S1) symmetric   intact    Skin:       well perfused       capillary refill brisk    Special tests:       straight leg raise left not done, does not want to lie supine        straight leg raise right not done, does not want to lie supine       unable TOM, does not want to lie supine        slump test no change       Radiology:  Visualized radiographs of lumbar spine obtained today, and reviewed the images with the patient.  Impression: multilevel degenerative change including facet arthrosis    XR Lumbar Spine 2-3 Views*    Narrative    LUMBAR SPINE TWO TO THREE VIEWS 3/2/2020 11:41 AM     COMPARISON: None.    HISTORY: Acute right-sided low back pain.      Impression    IMPRESSION: There is normal alignment of the lumbar vertebrae.  Vertebral body heights of the lumbar " spine are normal. There is no  evidence for fracture of the lumbar spine. There is severe facet  arthropathy at the L3-L4, L4-L5 and L5-S1 levels.    JAVIER SAENZ MD         Assessment:  1. Acute right-sided low back pain without sciatica    2. Lumbar spondylosis        Plan:  Discussed the assessment with the patient. Favor facet arthrosis as source. No acute bony abnormality noted on x-ray.  We discussed the following: symptom treatment, activity modification/rest, imaging, rehab, medication and potential for improvement with time. Following discussion, plan:  See patient instructions. He primarily prefers to monitor, with use of acetaminophen.  Acetaminophen use reviewed. Also already has rx for hydrocodone-acetaminophen from , but has not yet filled. He plans to do so.  X-ray of the lumbar spine reviewed. No clear indication to obtain advanced imaging currently.  Activity Modification: reviewed  Rehab: Physical Therapy: future consideration  Primarily wants to monitor for now. I think he is safe to do eye procedure tomorrow. He indicated he has already discussed this with his surgeon as well.  Follow up: will leave open ended. Monitor next ~1 week to start.  We discussed potentially concerning signs and symptoms related to the condition(s) listed above, including increase in pain, changing pain, neurologic symptoms, and the patient was instructed to seek appropriate medical care if noted. All questions answered to patient's satisfaction. The patient expressed understanding of the plan.     Jose Luis rOourke, DO, CAQ            Patient Instructions   X-rays show some wear and tear, or arthritis  Topical Treatments: Ice, Heat or Topical Analgesics as needed  Over the counter medication: Acetaminophen (Tylenol) 500-1000 mg every 6 hours (Maximum of 3000 mg/day); may use routinely for 1-2 weeks  Prescription Medication: you already have a prescription for hydrocodone-acetaminophen   Keep in mind the total dose  of the acetaminophen per day    Will monitor over the next few days  Safe to do procedure tomorrow  Contact clinic if any questions or concerns  Future considerations may include physical therapy or other imaging (for example, MRI)        Disclaimer: This note consists of symbols derived from keyboarding, dictation and/or voice recognition software. As a result, there may be errors in the script that have gone undetected. Please consider this when interpreting information found in this chart.        Again, thank you for allowing me to participate in the care of your patient.        Sincerely,        Jose Luis Orourke, DO

## 2020-04-08 DIAGNOSIS — I48.91 ATRIAL FIBRILLATION, UNSPECIFIED TYPE (H): ICD-10-CM

## 2020-04-08 NOTE — TELEPHONE ENCOUNTER
Requested Prescriptions   Pending Prescriptions Disp Refills     XARELTO ANTICOAGULANT 15 MG TABS tablet [Pharmacy Med Name: Xarelto Oral Tablet 15 MG] 30 tablet 2     Sig: Take 1 tablet (15 mg) by mouth daily (with dinner)   Last Written Prescription Date:  4-8-20  Last Fill Quantity: 30,  # refills: 2   Last office visit: 11/6/2019 with prescribing provider:  11-6-19   Future Office Visit:      Direct Oral Anticoagulant Agents Failed - 4/8/2020 12:37 PM        Failed - Normal Platelets on file in past 12 months     No lab results found.            Failed - Creatinine Clearance greater than 50 ml/min on file in past 3 mos     No lab results found.          Failed - Serum creatinine less than or equal to 1.4 on file in past 3 mos     Recent Labs   Lab Test 10/22/19  0840   CR 1.19       Ok to refill medication if creatinine is low          Passed - Medication is active on med list        Passed - Patient is 18 years of age or older        Passed - Recent (6 mo) or future (30 days) visit within the authorizing provider's specialty

## 2020-04-09 RX ORDER — RIVAROXABAN 15 MG/1
TABLET, FILM COATED ORAL
Qty: 30 TABLET | Refills: 3 | Status: SHIPPED | OUTPATIENT
Start: 2020-04-09 | End: 2020-10-27

## 2020-04-09 NOTE — TELEPHONE ENCOUNTER
Please let him know that he needs a blood draw post COVID for further refills. Labs and meds ordered.

## 2020-06-04 DIAGNOSIS — N18.30 TYPE 2 DIABETES MELLITUS WITH STAGE 3 CHRONIC KIDNEY DISEASE, WITHOUT LONG-TERM CURRENT USE OF INSULIN (H): ICD-10-CM

## 2020-06-04 DIAGNOSIS — E11.22 TYPE 2 DIABETES MELLITUS WITH STAGE 3 CHRONIC KIDNEY DISEASE, WITHOUT LONG-TERM CURRENT USE OF INSULIN (H): ICD-10-CM

## 2020-06-04 NOTE — TELEPHONE ENCOUNTER
Requested Prescriptions   Pending Prescriptions Disp Refills     glipiZIDE (GLUCOTROL XL) 10 MG 24 hr tablet  Last Written Prescription Date:  2/4/20  Last Fill Quantity: 180,  # refills: 0   Last office visit: 11/6/2019 with prescribing provider:  EFREM Delgado   Future Office Visit:         180 tablet 0       There is no refill protocol information for this order

## 2020-06-08 RX ORDER — GLIPIZIDE 10 MG/1
20 TABLET, FILM COATED, EXTENDED RELEASE ORAL DAILY
Qty: 60 TABLET | Refills: 0 | Status: SHIPPED | OUTPATIENT
Start: 2020-06-08 | End: 2020-06-15

## 2020-06-08 NOTE — TELEPHONE ENCOUNTER
"Routing refill request to provider for review/approval because:  Labs not current:  A1c  Patient needs to be seen because:  Pt was due for 6 month follow up 5/6/20    Medication pended for approval, 30 day supply with reminder.                 Requested Prescriptions   Pending Prescriptions Disp Refills     glipiZIDE (GLUCOTROL XL) 10 MG 24 hr tablet 180 tablet 0       Sulfonylurea Agents Failed - 6/4/2020  4:04 PM        Failed - Patient has documented A1c within the specified period of time.     If HgbA1C is 8 or greater, it needs to be on file within the past 3 months.  If less than 8, must be on file within the past 6 months.     Recent Labs   Lab Test 10/22/19  0840   A1C 7.4*             Failed - Recent (6 mo) or future (30 days) visit within the authorizing provider's specialty     Patient had office visit in the last 6 months or has a visit in the next 30 days with authorizing provider or within the authorizing provider's specialty.  See \"Patient Info\" tab in inbasket, or \"Choose Columns\" in Meds & Orders section of the refill encounter.            Passed - Medication is active on med list        Passed - Patient is age 18 or older        Passed - Patient has a recent creatinine (normal) within the past 12 mos.     Recent Labs   Lab Test 10/22/19  0840   CR 1.19       Ok to refill medication if creatinine is low               "

## 2020-06-12 NOTE — PROGRESS NOTES
"Camilo Young is a 86 year old male who is being evaluated via a billable telephone visit.      The patient has been notified of following:     \"This telephone visit will be conducted via a call between you and your physician/provider. We have found that certain health care needs can be provided without the need for a physical exam.  This service lets us provide the care you need with a short phone conversation.  If a prescription is necessary we can send it directly to your pharmacy.  If lab work is needed we can place an order for that and you can then stop by our lab to have the test done at a later time.    Telephone visits are billed at different rates depending on your insurance coverage. During this emergency period, for some insurers they may be billed the same as an in-person visit.  Please reach out to your insurance provider with any questions.    If during the course of the call the physician/provider feels a telephone visit is not appropriate, you will not be charged for this service.\"    Patient has given verbal consent for Telephone visit?  Yes    What phone number would you like to be contacted at? [phone]    How would you like to obtain your AVS? Mail a copy    Subjective     Camilo Young is a 86 year old male who presents via phone visit today for the following health issues:    HPI  Diabetes Follow-up    How often are you checking your blood sugar? One time daily  What time of day are you checking your blood sugars (select all that apply)?  in the morning  Have you had any blood sugars above 200?  No  Have you had any blood sugars below 70?  No    What symptoms do you notice when your blood sugar is low?  None and Not applicable    What concerns do you have today about your diabetes? None     Do you have any of these symptoms? (Select all that apply)  No numbness or tingling in feet.  No redness, sores or blisters on feet.  No complaints of excessive thirst.  No reports of blurry vision.  No " significant changes to weight.    Have you had a diabetic eye exam in the last 12 months? Yes- Date of last eye exam: [facility]    Recheck of his blood pressure. Not checking at home. Historically blood pressures have looked good.  No chest pain/sob/palps/ha/dizziness. Tolerating his meds and taking them.   AM sugars typically running in the low 100's.  No visions issues/changes.  No concerning neuropathy symptoms.   Denies any hypoglycemia.   Eating less. Eats a lot salads.   Not real active.  His copd is stable. symbicort has proven helpful. Using once a day.    Afib: no palpitations. No excessive bruising or bleeding.     BP Readings from Last 2 Encounters:   03/02/20 (!) 140/78   11/06/19 120/67     Hemoglobin A1C (%)   Date Value   10/22/2019 7.4 (H)   07/23/2019 8.9 (H)     LDL Cholesterol Calculated (mg/dL)   Date Value   07/23/2019 77   04/24/2018 51         Hypertension Follow-up      Do you check your blood pressure regularly outside of the clinic? No     Are you following a low salt diet? No    Are your blood pressures ever more than 140 on the top number (systolic) OR more   than 90 on the bottom number (diastolic), for example 140/90? No, not sure      How many servings of fruits and vegetables do you eat daily?  2-3    On average, how many sweetened beverages do you drink each day (Examples: soda, juice, sweet tea, etc.  Do NOT count diet or artificially sweetened beverages)?   0    How many days per week do you exercise enough to make your heart beat faster? 3 or less    How many minutes a day do you exercise enough to make your heart beat faster? 9 or less  How many days per week do you miss taking your medication? 1    What makes it hard for you to take your medications?  remembering to take      Patient Active Problem List   Diagnosis     COPD (chronic obstructive pulmonary disease) (H)     Obesity     Advanced directives, counseling/discussion     Cataract     H/O: CVA (cerebrovascular  accident)     CTS (carpal tunnel syndrome)     Primary osteoarthritis of left knee     MMT (medial meniscus tear)     Hyperlipidemia LDL goal <70     Essential hypertension with goal blood pressure less than 140/90     Chronic atrial fibrillation (H)     Type 2 diabetes mellitus with stage 3 chronic kidney disease, without long-term current use of insulin (H)     Decreased pulses in feet     Type 2 diabetes mellitus with diabetic polyneuropathy, without long-term current use of insulin (H)     Past Surgical History:   Procedure Laterality Date     DECOMPRESSION, FUSION LUMBAR POSTERIOR ONE LEVEL, COMBINED         Social History     Tobacco Use     Smoking status: Former Smoker     Packs/day: 2.00     Years: 40.00     Pack years: 80.00     Last attempt to quit: 1991     Years since quittin.1     Smokeless tobacco: Never Used   Substance Use Topics     Alcohol use: No     Family History   Problem Relation Age of Onset     Cancer - colorectal Mother      C.A.D. Sister      Cancer Sister          Current Outpatient Medications   Medication Sig Dispense Refill     amLODIPine (NORVASC) 5 MG tablet Take 1 tablet (5 mg) by mouth daily 90 tablet 3     glipiZIDE (GLUCOTROL XL) 10 MG 24 hr tablet Take 2 tablets (20 mg) by mouth daily 60 tablet 0     losartan (COZAAR) 25 MG tablet Take 1 tablet (25 mg) by mouth daily 30 tablet 0     metFORMIN (GLUCOPHAGE) 1000 MG tablet Take 1 tablet (1,000 mg) by mouth 2 times daily (with meals) 180 tablet 0     metoprolol succinate ER (TOPROL-XL) 50 MG 24 hr tablet Take 1 tablet (50 mg) by mouth daily 90 tablet 3     simvastatin (ZOCOR) 40 MG tablet Take 1 tablet (40 mg) by mouth At Bedtime 90 tablet 3     SYMBICORT 160-4.5 MCG/ACT Inhaler INHALE 2 PUFFS BY MOUTH INTO THE LUNGS 2 TIMES DAILY 10.2 g 1     XARELTO ANTICOAGULANT 15 MG TABS tablet Take 1 tablet (15 mg) by mouth daily (with dinner) 30 tablet 3     ACCU-CHEK SMARTVIEW test strip USE TO TEST BLOOD SUGAR 3 TIMES DAILY OR AS  RECOMMENDED BY PROVIDER 250 each 0     blood glucose monitoring (ACCU-CHEK FASTCLIX) lancets 1 each 3 times daily 1 Box 5     order for DME Glucometer, brand as covered by insurance. 1 each 0     No Known Allergies  Recent Labs   Lab Test 10/22/19  0840 07/23/19  0752 09/21/18  0944 04/24/18  0934  10/03/17  1103 03/17/17  1042   A1C 7.4* 8.9* 8.5* 7.9*   < > 8.1* 7.4*   LDL  --  77  --  51  --   --  75   HDL  --  55  --  50  --   --  51   TRIG  --  78  --  90  --   --  99   CR 1.19 1.22  --  1.11   < >  --  1.32*   GFRESTIMATED 55* 54*  --  63   < >  --  52*   GFRESTBLACK 64 62  --  76   < >  --  63   POTASSIUM 4.5 4.8  --  4.6   < >  --  4.6   TSH  --  11.32*  --   --   --  4.90* 4.57*    < > = values in this interval not displayed.      BP Readings from Last 3 Encounters:   03/02/20 (!) 140/78   11/06/19 120/67   07/31/19 129/81    Wt Readings from Last 3 Encounters:   03/02/20 100.7 kg (222 lb)   11/06/19 95.7 kg (211 lb)   07/31/19 97.1 kg (214 lb)                    Reviewed and updated as needed this visit by Provider         Review of Systems   Constitutional, HEENT, cardiovascular, pulmonary, GI, , musculoskeletal, neuro, skin, endocrine and psych systems are negative, except as otherwise noted.       Objective   Reported vitals:  There were no vitals taken for this visit.   healthy, alert and no distress  PSYCH: Alert and oriented times 3; coherent speech, normal   rate and volume, able to articulate logical thoughts, able   to abstract reason, no tangential thoughts, no hallucinations   or delusions  His affect is normal  RESP: No cough, no audible wheezing, able to talk in full sentences  Remainder of exam unable to be completed due to telephone visits    Diagnostic Test Results:  Labs reviewed in Epic        Assessment/Plan:  1. Type 2 diabetes mellitus with stage 3 chronic kidney disease, without long-term current use of insulin (H)  Stable. Borderline control.  - glipiZIDE (GLUCOTROL XL) 10 MG 24 hr  tablet; Take 2 tablets (20 mg) by mouth daily  Dispense: 180 tablet; Refill: 0  - metFORMIN (GLUCOPHAGE) 1000 MG tablet; Take 1 tablet (1,000 mg) by mouth 2 times daily (with meals)  Dispense: 180 tablet; Refill: 0    2. Hypertension goal BP (blood pressure) < 140/90  stable  - losartan (COZAAR) 25 MG tablet; Take 1 tablet (25 mg) by mouth daily  Dispense: 90 tablet; Refill: 0    3. Chronic obstructive pulmonary disease, unspecified COPD type (H)  Stable. Continue current meds    4. Atrial fibrillation, unspecified type (H)  Stable.    work on lifestyle modification  Recheck in 3 mos .           Phone call duration:  15 minutes    Aubrey Delgado PA-C

## 2020-06-15 ENCOUNTER — VIRTUAL VISIT (OUTPATIENT)
Dept: FAMILY MEDICINE | Facility: CLINIC | Age: 85
End: 2020-06-15
Payer: COMMERCIAL

## 2020-06-15 DIAGNOSIS — J44.9 CHRONIC OBSTRUCTIVE PULMONARY DISEASE, UNSPECIFIED COPD TYPE (H): ICD-10-CM

## 2020-06-15 DIAGNOSIS — I48.91 ATRIAL FIBRILLATION, UNSPECIFIED TYPE (H): ICD-10-CM

## 2020-06-15 DIAGNOSIS — E11.22 TYPE 2 DIABETES MELLITUS WITH STAGE 3 CHRONIC KIDNEY DISEASE, WITHOUT LONG-TERM CURRENT USE OF INSULIN (H): ICD-10-CM

## 2020-06-15 DIAGNOSIS — N18.30 TYPE 2 DIABETES MELLITUS WITH STAGE 3 CHRONIC KIDNEY DISEASE, WITHOUT LONG-TERM CURRENT USE OF INSULIN (H): ICD-10-CM

## 2020-06-15 DIAGNOSIS — I10 HYPERTENSION GOAL BP (BLOOD PRESSURE) < 140/90: ICD-10-CM

## 2020-06-15 PROCEDURE — 99214 OFFICE O/P EST MOD 30 MIN: CPT | Mod: 95 | Performed by: PHYSICIAN ASSISTANT

## 2020-06-15 RX ORDER — GLIPIZIDE 10 MG/1
20 TABLET, FILM COATED, EXTENDED RELEASE ORAL DAILY
Qty: 180 TABLET | Refills: 0 | Status: SHIPPED | OUTPATIENT
Start: 2020-06-15 | End: 2020-10-15

## 2020-06-15 RX ORDER — LOSARTAN POTASSIUM 25 MG/1
25 TABLET ORAL DAILY
Qty: 90 TABLET | Refills: 0 | Status: SHIPPED | OUTPATIENT
Start: 2020-06-15 | End: 2020-10-15

## 2020-07-24 DIAGNOSIS — I10 ESSENTIAL HYPERTENSION WITH GOAL BLOOD PRESSURE LESS THAN 140/90: ICD-10-CM

## 2020-07-24 DIAGNOSIS — I48.91 ATRIAL FIBRILLATION, UNSPECIFIED TYPE (H): ICD-10-CM

## 2020-07-24 RX ORDER — AMLODIPINE BESYLATE 5 MG/1
5 TABLET ORAL DAILY
Qty: 90 TABLET | Refills: 0 | Status: SHIPPED | OUTPATIENT
Start: 2020-07-24 | End: 2020-10-27

## 2020-07-24 RX ORDER — METOPROLOL SUCCINATE 50 MG/1
50 TABLET, EXTENDED RELEASE ORAL DAILY
Qty: 90 TABLET | Refills: 0 | Status: SHIPPED | OUTPATIENT
Start: 2020-07-24 | End: 2020-10-27

## 2020-07-27 DIAGNOSIS — E78.5 HYPERLIPIDEMIA LDL GOAL <70: ICD-10-CM

## 2020-07-27 NOTE — TELEPHONE ENCOUNTER
Requested Prescriptions   Pending Prescriptions Disp Refills     simvastatin (ZOCOR) 40 MG tablet 90 tablet 3     Sig: Take 1 tablet (40 mg) by mouth At Bedtime  Last Written Prescription Date:  4/25/20  Last Fill Quantity: 90,  # refills: 3   Last office visit: 11/6/2019 with prescribing provider:  EFREM Delgado   Future Office Visit:               There is no refill protocol information for this order

## 2020-07-30 NOTE — TELEPHONE ENCOUNTER
"Routing refill request to provider for review/approval because:  Labs not current:  ldl    Medication pended for approval, 90 day supply with reminder.             Requested Prescriptions   Pending Prescriptions Disp Refills     simvastatin (ZOCOR) 40 MG tablet 90 tablet 3     Sig: Take 1 tablet (40 mg) by mouth At Bedtime       Statins Protocol Failed - 7/27/2020  3:58 PM        Failed - LDL on file in past 12 months     Recent Labs   Lab Test 07/23/19  0752   LDL 77             Passed - No abnormal creatine kinase in past 12 months     No lab results found.             Passed - Recent (12 mo) or future (30 days) visit within the authorizing provider's specialty     Patient has had an office visit with the authorizing provider or a provider within the authorizing providers department within the previous 12 mos or has a future within next 30 days. See \"Patient Info\" tab in inbasket, or \"Choose Columns\" in Meds & Orders section of the refill encounter.              Passed - Medication is active on med list        Passed - Patient is age 18 or older             "

## 2020-07-31 RX ORDER — SIMVASTATIN 40 MG
40 TABLET ORAL AT BEDTIME
Qty: 90 TABLET | Refills: 0 | Status: SHIPPED | OUTPATIENT
Start: 2020-07-31 | End: 2020-10-27

## 2020-09-21 DIAGNOSIS — E11.22 TYPE 2 DIABETES MELLITUS WITH STAGE 3 CHRONIC KIDNEY DISEASE, WITHOUT LONG-TERM CURRENT USE OF INSULIN (H): ICD-10-CM

## 2020-09-21 DIAGNOSIS — N18.30 TYPE 2 DIABETES MELLITUS WITH STAGE 3 CHRONIC KIDNEY DISEASE, WITHOUT LONG-TERM CURRENT USE OF INSULIN (H): ICD-10-CM

## 2020-10-12 DIAGNOSIS — Z79.4 TYPE 2 DIABETES MELLITUS WITHOUT COMPLICATION, WITH LONG-TERM CURRENT USE OF INSULIN (H): Primary | ICD-10-CM

## 2020-10-12 DIAGNOSIS — E11.22 TYPE 2 DIABETES MELLITUS WITH STAGE 3 CHRONIC KIDNEY DISEASE, WITHOUT LONG-TERM CURRENT USE OF INSULIN (H): ICD-10-CM

## 2020-10-12 DIAGNOSIS — E11.9 TYPE 2 DIABETES MELLITUS WITHOUT COMPLICATION, WITH LONG-TERM CURRENT USE OF INSULIN (H): Primary | ICD-10-CM

## 2020-10-12 DIAGNOSIS — N18.30 TYPE 2 DIABETES MELLITUS WITH STAGE 3 CHRONIC KIDNEY DISEASE, WITHOUT LONG-TERM CURRENT USE OF INSULIN (H): ICD-10-CM

## 2020-10-13 NOTE — TELEPHONE ENCOUNTER
Routing refill request to provider for review/approval because:  Labs not current:  A1C    No appointment pending at this time.  Routing to provider to advise.    Wanda NGUYENN, RN

## 2020-10-15 RX ORDER — GLIPIZIDE 10 MG/1
TABLET, FILM COATED, EXTENDED RELEASE ORAL
Qty: 60 TABLET | Refills: 0 | Status: SHIPPED | OUTPATIENT
Start: 2020-10-15 | End: 2020-10-27

## 2020-10-27 ENCOUNTER — OFFICE VISIT (OUTPATIENT)
Dept: FAMILY MEDICINE | Facility: CLINIC | Age: 85
End: 2020-10-27
Payer: COMMERCIAL

## 2020-10-27 VITALS
HEART RATE: 77 BPM | DIASTOLIC BLOOD PRESSURE: 82 MMHG | SYSTOLIC BLOOD PRESSURE: 133 MMHG | RESPIRATION RATE: 24 BRPM | BODY MASS INDEX: 32.5 KG/M2 | TEMPERATURE: 97.4 F | WEIGHT: 227 LBS | HEIGHT: 70 IN | OXYGEN SATURATION: 94 %

## 2020-10-27 DIAGNOSIS — I10 HYPERTENSION GOAL BP (BLOOD PRESSURE) < 140/90: Primary | ICD-10-CM

## 2020-10-27 DIAGNOSIS — I10 ESSENTIAL HYPERTENSION WITH GOAL BLOOD PRESSURE LESS THAN 140/90: ICD-10-CM

## 2020-10-27 DIAGNOSIS — N18.31 TYPE 2 DIABETES MELLITUS WITH STAGE 3A CHRONIC KIDNEY DISEASE, WITHOUT LONG-TERM CURRENT USE OF INSULIN (H): ICD-10-CM

## 2020-10-27 DIAGNOSIS — I48.91 ATRIAL FIBRILLATION, UNSPECIFIED TYPE (H): ICD-10-CM

## 2020-10-27 DIAGNOSIS — E11.22 TYPE 2 DIABETES MELLITUS WITH STAGE 3A CHRONIC KIDNEY DISEASE, WITHOUT LONG-TERM CURRENT USE OF INSULIN (H): ICD-10-CM

## 2020-10-27 DIAGNOSIS — E78.5 HYPERLIPIDEMIA LDL GOAL <70: ICD-10-CM

## 2020-10-27 DIAGNOSIS — Z79.4 TYPE 2 DIABETES MELLITUS WITHOUT COMPLICATION, WITH LONG-TERM CURRENT USE OF INSULIN (H): ICD-10-CM

## 2020-10-27 DIAGNOSIS — Z00.00 ENCOUNTER FOR MEDICARE ANNUAL WELLNESS EXAM: ICD-10-CM

## 2020-10-27 DIAGNOSIS — Z13.220 SCREENING FOR HYPERLIPIDEMIA: ICD-10-CM

## 2020-10-27 DIAGNOSIS — E11.9 TYPE 2 DIABETES MELLITUS WITHOUT COMPLICATION, WITH LONG-TERM CURRENT USE OF INSULIN (H): ICD-10-CM

## 2020-10-27 DIAGNOSIS — J41.8 MIXED SIMPLE AND MUCOPURULENT CHRONIC BRONCHITIS (H): ICD-10-CM

## 2020-10-27 LAB
ANION GAP SERPL CALCULATED.3IONS-SCNC: 6 MMOL/L (ref 3–14)
BUN SERPL-MCNC: 15 MG/DL (ref 7–30)
CALCIUM SERPL-MCNC: 8.4 MG/DL (ref 8.5–10.1)
CHLORIDE SERPL-SCNC: 106 MMOL/L (ref 94–109)
CHOLEST SERPL-MCNC: 137 MG/DL
CO2 SERPL-SCNC: 25 MMOL/L (ref 20–32)
CREAT SERPL-MCNC: 1.15 MG/DL (ref 0.66–1.25)
GFR SERPL CREATININE-BSD FRML MDRD: 57 ML/MIN/{1.73_M2}
GLUCOSE SERPL-MCNC: 178 MG/DL (ref 70–99)
HBA1C MFR BLD: 9.4 % (ref 0–5.6)
HDLC SERPL-MCNC: 52 MG/DL
LDLC SERPL CALC-MCNC: 67 MG/DL
NONHDLC SERPL-MCNC: 85 MG/DL
POTASSIUM SERPL-SCNC: 4.8 MMOL/L (ref 3.4–5.3)
SODIUM SERPL-SCNC: 137 MMOL/L (ref 133–144)
TRIGL SERPL-MCNC: 92 MG/DL

## 2020-10-27 PROCEDURE — 83036 HEMOGLOBIN GLYCOSYLATED A1C: CPT | Performed by: PHYSICIAN ASSISTANT

## 2020-10-27 PROCEDURE — 80048 BASIC METABOLIC PNL TOTAL CA: CPT | Performed by: PHYSICIAN ASSISTANT

## 2020-10-27 PROCEDURE — 36415 COLL VENOUS BLD VENIPUNCTURE: CPT | Performed by: PHYSICIAN ASSISTANT

## 2020-10-27 PROCEDURE — 99207 PR FOOT EXAM NO CHARGE: CPT | Mod: 25 | Performed by: PHYSICIAN ASSISTANT

## 2020-10-27 PROCEDURE — 80061 LIPID PANEL: CPT | Performed by: PHYSICIAN ASSISTANT

## 2020-10-27 PROCEDURE — 82043 UR ALBUMIN QUANTITATIVE: CPT | Performed by: PHYSICIAN ASSISTANT

## 2020-10-27 PROCEDURE — 99397 PER PM REEVAL EST PAT 65+ YR: CPT | Performed by: PHYSICIAN ASSISTANT

## 2020-10-27 PROCEDURE — 99213 OFFICE O/P EST LOW 20 MIN: CPT | Mod: 25 | Performed by: PHYSICIAN ASSISTANT

## 2020-10-27 RX ORDER — METOPROLOL SUCCINATE 50 MG/1
50 TABLET, EXTENDED RELEASE ORAL DAILY
Qty: 90 TABLET | Refills: 3 | Status: SHIPPED | OUTPATIENT
Start: 2020-10-27 | End: 2021-12-11

## 2020-10-27 RX ORDER — AMLODIPINE BESYLATE 5 MG/1
5 TABLET ORAL DAILY
Qty: 90 TABLET | Refills: 1 | Status: SHIPPED | OUTPATIENT
Start: 2020-10-27 | End: 2021-02-22

## 2020-10-27 RX ORDER — SIMVASTATIN 40 MG
40 TABLET ORAL AT BEDTIME
Qty: 90 TABLET | Refills: 3 | Status: SHIPPED | OUTPATIENT
Start: 2020-10-27 | End: 2021-09-10

## 2020-10-27 RX ORDER — GLIPIZIDE 10 MG/1
20 TABLET, FILM COATED, EXTENDED RELEASE ORAL DAILY
Qty: 180 TABLET | Refills: 0 | Status: SHIPPED | OUTPATIENT
Start: 2020-10-27 | End: 2021-02-18

## 2020-10-27 RX ORDER — BUDESONIDE AND FORMOTEROL FUMARATE DIHYDRATE 160; 4.5 UG/1; UG/1
AEROSOL RESPIRATORY (INHALATION)
Qty: 10.2 G | Refills: 4 | Status: SHIPPED | OUTPATIENT
Start: 2020-10-27 | End: 2021-08-25

## 2020-10-27 RX ORDER — LOSARTAN POTASSIUM 25 MG/1
25 TABLET ORAL DAILY
Qty: 90 TABLET | Refills: 1 | Status: SHIPPED | OUTPATIENT
Start: 2020-10-27 | End: 2021-02-22

## 2020-10-27 ASSESSMENT — MIFFLIN-ST. JEOR: SCORE: 1707.98

## 2020-10-27 NOTE — PATIENT INSTRUCTIONS
Patient Education   Personalized Prevention Plan  You are due for the preventive services outlined below.  Your care team is available to assist you in scheduling these services.  If you have already completed any of these items, please share that information with your care team to update in your medical record.  Health Maintenance Due   Topic Date Due     Hepatitis B Vaccine (1 of 3 - Risk 3-dose series) 03/02/1953     Zoster (Shingles) Vaccine (1 of 2) 03/02/1984     Annual Wellness Visit  03/02/1999     Eye Exam  09/10/2019     A1C Lab  01/22/2020     Cholesterol Lab  07/23/2020     FALL RISK ASSESSMENT  07/23/2020     Basic Metabolic Panel  10/22/2020     Kidney Microalbumin Urine Test  10/22/2020     Diabetic Foot Exam  11/06/2020

## 2020-10-27 NOTE — PROGRESS NOTES
"  SUBJECTIVE:   Camilo Young is a 86 year old male who presents for Preventive Visit.      Patient has been advised of split billing requirements and indicates understanding: Yes  Are you in the first 12 months of your Medicare Part B coverage?  No    Physical Health:    In general, how would you rate your overall physical health? good    Outside of work, how many days during the week do you exercise? 6-7 days/week    Outside of work, approximately how many minutes a day do you exercise?15-30 minutes    If you drink alcohol do you typically have >3 drinks per day or >7 drinks per week? Not Applicable    Do you usually eat at least 4 servings of fruit and vegetables a day, include whole grains & fiber and avoid regularly eating high fat or \"junk\" foods? NO    Do you have any problems taking medications regularly?  No    Do you have any side effects from medications? not applicable    Needs assistance for the following daily activities: no assistance needed    Which of the following safety concerns are present in your home?  none identified     Hearing impairment: Yes, has hearing aides but doesn't wear them    In the past 6 months, have you been bothered by leaking of urine? no    Mental Health:    In general, how would you rate your overall mental or emotional health? excellent  PHQ-2 Score:  0    Do you feel safe in your environment? Yes    Have you ever done Advance Care Planning? (For example, a Health Directive, POLST, or a discussion with a medical provider or your loved ones about your wishes): No, advance care planning information given to patient to review.  Patient plans to discuss their wishes with loved ones or provider.      Additional concerns to address?  No    Fall risk: 0  Fallen 2 or more times in the past year?: No  Any fall with injury in the past year?: No    Cognitive Screenin) Repeat 3 items (Leader, Season, Table)    2) Clock draw: NORMAL  3) 3 item recall: Recalls 1 object   Results: " NORMAL clock, 1-2 items recalled: COGNITIVE IMPAIRMENT LESS LIKELY    Mini-CogTM Copyright TOSHIA George. Licensed by the author for use in [facility]; reprinted with permission (rosiat@[facility]). All rights reserved.      Do you have sleep apnea, excessive snoring or daytime drowsiness?: sometimes tired during the day            Reviewed and updated as needed this visit by clinical staff  Tobacco  Allergies  Meds     Fam Hx  Soc Hx        Reviewed and updated as needed this visit by Provider                Social History     Tobacco Use     Smoking status: Former Smoker     Packs/day: 2.00     Years: 40.00     Pack years: 80.00     Quit date: 1991     Years since quittin.4     Smokeless tobacco: Never Used   Substance Use Topics     Alcohol use: No                           Current providers sharing in care for this patient include:   Patient Care Team:  Aubrey Delgado PA-C as PCP - General (Physician Assistant)  Aubrey Delgado PA-C as Assigned PCP    The following health maintenance items are reviewed in Epic and correct as of today:  Health Maintenance   Topic Date Due     HEPATITIS B IMMUNIZATION (1 of 3 - Risk 3-dose series) 1953     ZOSTER IMMUNIZATION (1 of 2) 1984     EYE EXAM  09/10/2019     FALL RISK ASSESSMENT  2020     A1C  2021     MEDICARE ANNUAL WELLNESS VISIT  10/27/2021     BMP  10/27/2021     LIPID  10/27/2021     MICROALBUMIN  10/27/2021     DIABETIC FOOT EXAM  10/27/2021     DTAP/TDAP/TD IMMUNIZATION (2 - Td) 2022     ADVANCE CARE PLANNING  10/27/2025     SPIROMETRY  Completed     COPD ACTION PLAN  Completed     PHQ-2  Completed     INFLUENZA VACCINE  Completed     Pneumococcal Vaccine: 65+ Years  Completed     Pneumococcal Vaccine: Pediatrics (0 to 5 Years) and At-Risk Patients (6 to 64 Years)  Aged Out     IPV IMMUNIZATION  Aged Out     MENINGITIS IMMUNIZATION  Aged Out     Lab work is in process  Labs reviewed in EPIC  BP Readings from  Last 3 Encounters:   10/27/20 133/82   20 (!) 140/78   19 120/67    Wt Readings from Last 3 Encounters:   10/27/20 103 kg (227 lb)   20 100.7 kg (222 lb)   19 95.7 kg (211 lb)                  Patient Active Problem List   Diagnosis     COPD (chronic obstructive pulmonary disease) (H)     Obesity     Advanced directives, counseling/discussion     Cataract     H/O: CVA (cerebrovascular accident)     CTS (carpal tunnel syndrome)     Primary osteoarthritis of left knee     MMT (medial meniscus tear)     Hyperlipidemia LDL goal <70     Essential hypertension with goal blood pressure less than 140/90     Chronic atrial fibrillation (H)     Type 2 diabetes mellitus with stage 3 chronic kidney disease, without long-term current use of insulin (H)     Decreased pulses in feet     Type 2 diabetes mellitus with diabetic polyneuropathy, without long-term current use of insulin (H)     Past Surgical History:   Procedure Laterality Date     DECOMPRESSION, FUSION LUMBAR POSTERIOR ONE LEVEL, COMBINED         Social History     Tobacco Use     Smoking status: Former Smoker     Packs/day: 2.00     Years: 40.00     Pack years: 80.00     Quit date: 1991     Years since quittin.4     Smokeless tobacco: Never Used   Substance Use Topics     Alcohol use: No     Family History   Problem Relation Age of Onset     Cancer - colorectal Mother      C.A.D. Sister      Cancer Sister          Current Outpatient Medications   Medication Sig Dispense Refill     amLODIPine (NORVASC) 5 MG tablet Take 1 tablet (5 mg) by mouth daily 90 tablet 1     budesonide-formoterol (SYMBICORT) 160-4.5 MCG/ACT Inhaler INHALE 2 PUFFS BY MOUTH INTO THE LUNGS 2 TIMES DAILY 10.2 g 4     glipiZIDE (GLUCOTROL XL) 10 MG 24 hr tablet Take 2 tablets (20 mg) by mouth daily 180 tablet 0     losartan (COZAAR) 25 MG tablet Take 1 tablet (25 mg) by mouth daily 90 tablet 1     metFORMIN (GLUCOPHAGE) 1000 MG tablet Take 1 tablet (1,000 mg) by  "mouth 2 times daily (with meals) 180 tablet 0     metoprolol succinate ER (TOPROL-XL) 50 MG 24 hr tablet Take 1 tablet (50 mg) by mouth daily 90 tablet 3     simvastatin (ZOCOR) 40 MG tablet Take 1 tablet (40 mg) by mouth At Bedtime 90 tablet 3     ACCU-CHEK SMARTVIEW test strip USE TO TEST BLOOD SUGAR 3 TIMES DAILY OR AS RECOMMENDED BY PROVIDER 250 each 0     blood glucose monitoring (ACCU-CHEK FASTCLIX) lancets 1 each 3 times daily 1 Box 5     order for DME Glucometer, brand as covered by insurance. 1 each 0     No Known Allergies  Recent Labs   Lab Test 10/27/20  1021 10/22/19  0840 07/23/19  0752 04/24/18  0934 04/24/18  0934 10/03/17  1103 10/03/17  1103 03/17/17  1042   A1C 9.4* 7.4* 8.9*   < > 7.9*   < > 8.1* 7.4*   LDL  --   --  77  --  51  --   --  75   HDL  --   --  55  --  50  --   --  51   TRIG  --   --  78  --  90  --   --  99   CR  --  1.19 1.22  --  1.11   < >  --  1.32*   GFRESTIMATED  --  55* 54*  --  63   < >  --  52*   GFRESTBLACK  --  64 62  --  76   < >  --  63   POTASSIUM  --  4.5 4.8  --  4.6   < >  --  4.6   TSH  --   --  11.32*  --   --   --  4.90* 4.57*    < > = values in this interval not displayed.          ROS:  Constitutional, HEENT, cardiovascular, pulmonary, GI, , musculoskeletal, neuro, skin, endocrine and psych systems are negative, except as otherwise noted.    OBJECTIVE:   /82   Pulse 77   Temp 97.4  F (36.3  C) (Tympanic)   Resp 24   Ht 1.765 m (5' 9.5\")   Wt 103 kg (227 lb)   SpO2 94%   BMI 33.04 kg/m   Estimated body mass index is 33.04 kg/m  as calculated from the following:    Height as of this encounter: 1.765 m (5' 9.5\").    Weight as of this encounter: 103 kg (227 lb).  EXAM:   GENERAL: healthy, alert and no distress  EYES: Eyes grossly normal to inspection, PERRL and conjunctivae and sclerae normal  HENT: ear canals and TM's normal, nose and mouth without ulcers or lesions  NECK: no adenopathy, no asymmetry, masses, or scars and thyroid normal to " palpation  RESP: lungs clear to auscultation - no rales, rhonchi or wheezes  CV: regular rate and rhythm, normal S1 S2, no S3 or S4, no murmur, click or rub, no peripheral edema and peripheral pulses strong  ABDOMEN: soft, nontender, no hepatosplenomegaly, no masses and bowel sounds normal  MS: no gross musculoskeletal defects noted, no edema  SKIN: no suspicious lesions or rashes  NEURO: Normal strength and tone, mentation intact and speech normal  PSYCH: mentation appears normal, affect normal/bright  Diabetic foot exam: no trophic changes or ulcerative lesions, normal sensory exam, normal monofilament exam, DP reduced bilateral and PT reduced bilateral    Diagnostic Test Results:  Labs reviewed in Epic    ASSESSMENT / PLAN:       ICD-10-CM    1. Hypertension goal BP (blood pressure) < 140/90  I10 Basic metabolic panel  (Ca, Cl, CO2, Creat, Gluc, K, Na, BUN)     losartan (COZAAR) 25 MG tablet   2. Encounter for Medicare annual wellness exam  Z00.00    3. Screening for hyperlipidemia  Z13.220    4. Type 2 diabetes mellitus with stage 3a chronic kidney disease, without long-term current use of insulin (H)  E11.21 HEMOGLOBIN A1C    N18.31 Albumin Random Urine Quantitative with Creat Ratio     OPTOMETRY REFERRAL     FOOT EXAM     Basic metabolic panel  (Ca, Cl, CO2, Creat, Gluc, K, Na, BUN)     metFORMIN (GLUCOPHAGE) 1000 MG tablet     glipiZIDE (GLUCOTROL XL) 10 MG 24 hr tablet   5. Hyperlipidemia LDL goal <70  E78.5 Lipid panel reflex to direct LDL Fasting     simvastatin (ZOCOR) 40 MG tablet   6. Atrial fibrillation, unspecified type (H)  I48.91 metoprolol succinate ER (TOPROL-XL) 50 MG 24 hr tablet   7. Type 2 diabetes mellitus without complication, with long-term current use of insulin (H)  E11.9 glipiZIDE (GLUCOTROL XL) 10 MG 24 hr tablet    Z79.4    8. Essential hypertension with goal blood pressure less than 140/90  I10 amLODIPine (NORVASC) 5 MG tablet   9. 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)   "J41.8 budesonide-formoterol (SYMBICORT) 160-4.5 MCG/ACT Inhaler   Advised supportive and symptomatic treatment.  Follow up with Provider - if condition persists or worsens.   Recheck in 3-4 mos     Patient has been advised of split billing requirements and indicates understanding: Yes    COUNSELING:  Reviewed preventive health counseling, as reflected in patient instructions       Regular exercise       Healthy diet/nutrition    Estimated body mass index is 33.04 kg/m  as calculated from the following:    Height as of this encounter: 1.765 m (5' 9.5\").    Weight as of this encounter: 103 kg (227 lb).    Weight management plan: Discussed healthy diet and exercise guidelines    He reports that he quit smoking about 29 years ago. He has a 80.00 pack-year smoking history. He has never used smokeless tobacco.    Appropriate preventive services were discussed with this patient, including applicable screening as appropriate for cardiovascular disease, diabetes, osteopenia/osteoporosis, and glaucoma.  As appropriate for age/gender, discussed screening for colorectal cancer, prostate cancer, breast cancer, and cervical cancer. Checklist reviewing preventive services available has been given to the patient.    Reviewed patients plan of care and provided an AVS. The Basic Care Plan (routine screening as documented in Health Maintenance) for Camilo meets the Care Plan requirement. This Care Plan has been established and reviewed with the Patient.    Counseling Resources:  ATP IV Guidelines  Pooled Cohorts Equation Calculator  Breast Cancer Risk Calculator  BRCA-Related Cancer Risk Assessment: FHS-7 Tool  FRAX Risk Assessment  ICSI Preventive Guidelines  Dietary Guidelines for Americans, 2010  [employer]'s MyPlate  ASA Prophylaxis  Lung CA Screening    [facility]  "

## 2020-10-28 LAB
CREAT UR-MCNC: 124 MG/DL
MICROALBUMIN UR-MCNC: 81 MG/L
MICROALBUMIN/CREAT UR: 65.56 MG/G CR (ref 0–17)

## 2021-01-07 ENCOUNTER — TELEPHONE (OUTPATIENT)
Dept: FAMILY MEDICINE | Facility: CLINIC | Age: 86
End: 2021-01-07

## 2021-01-07 NOTE — TELEPHONE ENCOUNTER
"I called and spoke to patient, he say he called about the same question last week and was told to call back this week.    I advised we don't know the timeline yet, advised he watch the [facility] and [facility] websites and when he feels he is in the \"group\" to get the vaccine, call if has not been contacted by mail or phone.    Patient verbalized understanding of and agreement with plan.    Cynthia Chapman RN  [facility]      "

## 2021-02-16 DIAGNOSIS — E11.9 TYPE 2 DIABETES MELLITUS WITHOUT COMPLICATION, WITH LONG-TERM CURRENT USE OF INSULIN (H): ICD-10-CM

## 2021-02-16 DIAGNOSIS — N18.31 TYPE 2 DIABETES MELLITUS WITH STAGE 3A CHRONIC KIDNEY DISEASE, WITHOUT LONG-TERM CURRENT USE OF INSULIN (H): ICD-10-CM

## 2021-02-16 DIAGNOSIS — Z79.4 TYPE 2 DIABETES MELLITUS WITHOUT COMPLICATION, WITH LONG-TERM CURRENT USE OF INSULIN (H): ICD-10-CM

## 2021-02-16 DIAGNOSIS — E11.22 TYPE 2 DIABETES MELLITUS WITH STAGE 3A CHRONIC KIDNEY DISEASE, WITHOUT LONG-TERM CURRENT USE OF INSULIN (H): ICD-10-CM

## 2021-02-17 NOTE — TELEPHONE ENCOUNTER
Routing refill request to provider for review/approval because:  Needs provider approval.  Radha Mendez RN  [facility]

## 2021-02-18 RX ORDER — GLIPIZIDE 10 MG/1
20 TABLET, FILM COATED, EXTENDED RELEASE ORAL DAILY
Qty: 60 TABLET | Refills: 0 | Status: SHIPPED | OUTPATIENT
Start: 2021-02-18 | End: 2021-02-22

## 2021-02-22 ENCOUNTER — OFFICE VISIT (OUTPATIENT)
Dept: FAMILY MEDICINE | Facility: CLINIC | Age: 86
End: 2021-02-22
Payer: COMMERCIAL

## 2021-02-22 VITALS
RESPIRATION RATE: 20 BRPM | OXYGEN SATURATION: 99 % | TEMPERATURE: 98.1 F | SYSTOLIC BLOOD PRESSURE: 138 MMHG | DIASTOLIC BLOOD PRESSURE: 74 MMHG | BODY MASS INDEX: 32.23 KG/M2 | WEIGHT: 221.4 LBS | HEART RATE: 70 BPM

## 2021-02-22 DIAGNOSIS — L20.89 OTHER ATOPIC DERMATITIS: ICD-10-CM

## 2021-02-22 DIAGNOSIS — N18.31 TYPE 2 DIABETES MELLITUS WITH STAGE 3A CHRONIC KIDNEY DISEASE, WITHOUT LONG-TERM CURRENT USE OF INSULIN (H): ICD-10-CM

## 2021-02-22 DIAGNOSIS — E11.22 TYPE 2 DIABETES MELLITUS WITH STAGE 3A CHRONIC KIDNEY DISEASE, WITHOUT LONG-TERM CURRENT USE OF INSULIN (H): ICD-10-CM

## 2021-02-22 DIAGNOSIS — Z79.4 TYPE 2 DIABETES MELLITUS WITHOUT COMPLICATION, WITH LONG-TERM CURRENT USE OF INSULIN (H): Primary | ICD-10-CM

## 2021-02-22 DIAGNOSIS — I10 HYPERTENSION GOAL BP (BLOOD PRESSURE) < 140/90: ICD-10-CM

## 2021-02-22 DIAGNOSIS — I10 ESSENTIAL HYPERTENSION WITH GOAL BLOOD PRESSURE LESS THAN 140/90: ICD-10-CM

## 2021-02-22 DIAGNOSIS — E11.9 TYPE 2 DIABETES MELLITUS WITHOUT COMPLICATION, WITH LONG-TERM CURRENT USE OF INSULIN (H): Primary | ICD-10-CM

## 2021-02-22 DIAGNOSIS — I48.91 ATRIAL FIBRILLATION, UNSPECIFIED TYPE (H): ICD-10-CM

## 2021-02-22 LAB — HBA1C MFR BLD: 9.1 % (ref 0–5.6)

## 2021-02-22 PROCEDURE — 36415 COLL VENOUS BLD VENIPUNCTURE: CPT | Performed by: PHYSICIAN ASSISTANT

## 2021-02-22 PROCEDURE — 99214 OFFICE O/P EST MOD 30 MIN: CPT | Performed by: PHYSICIAN ASSISTANT

## 2021-02-22 PROCEDURE — 83036 HEMOGLOBIN GLYCOSYLATED A1C: CPT | Performed by: PHYSICIAN ASSISTANT

## 2021-02-22 RX ORDER — GLIPIZIDE 10 MG/1
20 TABLET, FILM COATED, EXTENDED RELEASE ORAL DAILY
Qty: 180 TABLET | Refills: 0 | Status: SHIPPED | OUTPATIENT
Start: 2021-02-22 | End: 2021-06-24

## 2021-02-22 RX ORDER — TRIAMCINOLONE ACETONIDE 1 MG/G
CREAM TOPICAL 2 TIMES DAILY
Qty: 80 G | Refills: 0 | Status: SHIPPED | OUTPATIENT
Start: 2021-02-22 | End: 2021-06-24

## 2021-02-22 RX ORDER — AMLODIPINE BESYLATE 10 MG/1
10 TABLET ORAL DAILY
Qty: 90 TABLET | Refills: 1 | Status: SHIPPED | OUTPATIENT
Start: 2021-02-22 | End: 2021-06-24

## 2021-02-22 RX ORDER — LOSARTAN POTASSIUM 25 MG/1
25 TABLET ORAL DAILY
Qty: 90 TABLET | Refills: 1 | Status: SHIPPED | OUTPATIENT
Start: 2021-02-22 | End: 2021-06-24

## 2021-02-22 NOTE — PROGRESS NOTES
Christoph Page is a 86 year old who presents for the following health issues    HPI       Diabetes Follow-up    How often are you checking your blood sugar? One time daily  What time of day are you checking your blood sugars (select all that apply)?  every morning  Have you had any blood sugars above 200?  No  Have you had any blood sugars below 70?  No    What symptoms do you notice when your blood sugar is low?  None and Not applicable    What concerns do you have today about your diabetes? None     Do you have any of these symptoms? (Select all that apply)  No numbness or tingling in feet.  No redness, sores or blisters on feet.  No complaints of excessive thirst.  No reports of blurry vision.  No significant changes to weight.    Have you had a diabetic eye exam in the last 12 months? Yes- Date of last eye exam: within the last year, not sure when  Overall feeling great.  AM sugars running in the low 100's. No polyuria/dipsia/fatigue/cp/sob/palps.   Right upper arm rash. Pruritic.     BP Readings from Last 2 Encounters:   02/22/21 138/74   10/27/20 133/82     Hemoglobin A1C (%)   Date Value   02/22/2021 9.1 (H)   10/27/2020 9.4 (H)     LDL Cholesterol Calculated (mg/dL)   Date Value   10/27/2020 67   07/23/2019 77           How many servings of fruits and vegetables do you eat daily?  2-3    On average, how many sweetened beverages do you drink each day (Examples: soda, juice, sweet tea, etc.  Do NOT count diet or artificially sweetened beverages)?   0-1    How many days per week do you exercise enough to make your heart beat faster? 3 or less    How many minutes a day do you exercise enough to make your heart beat faster? 9 or less    How many days per week do you miss taking your medication? 0        Review of Systems   Constitutional, HEENT, cardiovascular, pulmonary, GI, , musculoskeletal, neuro, skin, endocrine and psych systems are negative, except as otherwise noted.      Objective    BP  138/74   Pulse 70   Temp 98.1  F (36.7  C) (Tympanic)   Resp 20   Wt 100.4 kg (221 lb 6.4 oz)   SpO2 99%   BMI 32.23 kg/m    Body mass index is 32.23 kg/m .  Physical Exam         Eye exam - right eye normal lid, conjunctiva, cornea, pupil and fundus, left eye normal lid, conjunctiva, cornea, pupil and fundus.  Thyroid not palpable, not enlarged, no nodules detected.  CHEST:chest clear to IPPA, no tachypnea, retractions or cyanosis and S1, S2 normal, no murmur, no gallop, rate regular.  Foot exam - both sides normal; no swelling, tenderness or skin or vascular lesions. Color and temperature is normal. Sensation is intact. Peripheral pulses are palpable. Toenails are normal.    Camilo was seen today for diabetes.    Diagnoses and all orders for this visit:    Type 2 diabetes mellitus without complication, with long-term current use of insulin (H)  -     HEMOGLOBIN A1C  -     JUST IN CASE  -     glipiZIDE (GLUCOTROL XL) 10 MG 24 hr tablet; Take 2 tablets (20 mg) by mouth daily  -     AMBULATORY ADULT DIABETES EDUCATOR REFERRAL; Future    Hypertension goal BP (blood pressure) < 140/90  -     JUST IN CASE  -     losartan (COZAAR) 25 MG tablet; Take 1 tablet (25 mg) by mouth daily    Atrial fibrillation, unspecified type (H)    Other atopic dermatitis  -     triamcinolone (KENALOG) 0.1 % external cream; Apply topically 2 times daily    Essential hypertension with goal blood pressure less than 140/90  -     amLODIPine (NORVASC) 10 MG tablet; Take 1 tablet (10 mg) by mouth daily    Type 2 diabetes mellitus with stage 3a chronic kidney disease, without long-term current use of insulin (H)  -     metFORMIN (GLUCOPHAGE) 1000 MG tablet; Take 1 tablet (1,000 mg) by mouth 2 times daily (with meals)  -     glipiZIDE (GLUCOTROL XL) 10 MG 24 hr tablet; Take 2 tablets (20 mg) by mouth daily      Really work on dietary changes to improve his glycemic control.  Inc amlodipine from 5 to 10 mg daily to improve his systolic blood  pressure.  work on lifestyle modification  Recheck in 3 mos.

## 2021-04-09 ENCOUNTER — TELEPHONE (OUTPATIENT)
Dept: FAMILY MEDICINE | Facility: CLINIC | Age: 86
End: 2021-04-09

## 2021-04-09 NOTE — TELEPHONE ENCOUNTER
Diabetes Education Scheduling Outreach #1:    Call to patient to schedule. Left message with phone number to call to schedule.    Plan for 2nd outreach attempt within 1 week.    Ana Gerardo  [facility]  Diabetes and Nutrition Scheduling

## 2021-04-15 NOTE — TELEPHONE ENCOUNTER
Diabetes Education Scheduling Outreach #2:    Call to patient to schedule. Patient declined to schedule, will wait until next fall.      Rich Jasmine  [facility] [facility]  Diabetes and Nutrition Scheduling

## 2021-05-24 ENCOUNTER — TELEPHONE (OUTPATIENT)
Dept: FAMILY MEDICINE | Facility: CLINIC | Age: 86
End: 2021-05-24

## 2021-05-24 NOTE — TELEPHONE ENCOUNTER
Reason for Call:  Other     Detailed comments: Patient said that he is unsure if he was discontinued on the metoprolol. Please leave a message.     Phone Number Patient can be reached at: Home number on file [phone] (home)    Best Time:     Can we leave a detailed message on this number? YES    Call taken on 5/24/2021 at 11:58 AM by Ayah Del Cid

## 2021-05-24 NOTE — TELEPHONE ENCOUNTER
Metoprolol is still on [facility] med list.    Patient was seen 2/22/21 by Aubrey Delgado.     Plan:    Really work on dietary changes to improve his glycemic control.  Inc amlodipine from 5 to 10 mg daily to improve his systolic blood pressure.  work on lifestyle modification  Recheck in 3 mos.        Due for follow up now.    Attempted to call patient at home number, no answer, left message on voicemail; patient was instructed to return call to [facility] at [phone].    Per his request, I did leave a brief message advising he was not taken off the medication in question.   However, I would like to talk to him (plan to schedule follow up and make sure he increased the amlodipine as advised).     Await call back.    Cynthia Chapman RN  [facility]

## 2021-05-25 DIAGNOSIS — I48.91 ATRIAL FIBRILLATION, UNSPECIFIED TYPE (H): ICD-10-CM

## 2021-05-25 RX ORDER — METOPROLOL SUCCINATE 50 MG/1
50 TABLET, EXTENDED RELEASE ORAL DAILY
Qty: 90 TABLET | Refills: 3 | Status: CANCELLED | OUTPATIENT
Start: 2021-05-25

## 2021-05-25 NOTE — TELEPHONE ENCOUNTER
"Patient called back.       Reconciled meds; he has been taking only 5 mg amlodipine; he has bottles of both 5 mg and 10 mg, he states he understands the error and will take 10 mg going forward.    BP Readings from Last 3 Encounters:   02/22/21 138/74   10/27/20 133/82   03/02/20 (!) 140/78       He does not have metoprolol on hand.  Says he has not taken it for a few months at least.  He says he was just in to talk to the pharmacy and they have refills on file but can't figure out why they have not been dispensing.   All Camilo can think of is he may have been \"in the donut hole\" and the metoprolol was too expensive.    He says the pharmacy is checking on the price for him now.    I advised he call back if he cannot afford the metoprolol or does not fill it for some reason and go back on it.    Scheduled for follow up 6/24/21 to give him time to get back on meds.    Patient verbalized understanding of and agreement with plan.    Cynthia Chapman RN  [facility]              "

## 2021-05-25 NOTE — TELEPHONE ENCOUNTER
Metoprolol, Rx for 90 days with 3 refills was sent to requested pharmacy on 10/27/20.   Should have refills available.   See other message from patient in which he wonders if he should be on this med.    Closing this encounter, will deal with issue in other encounter, due for office visit.    Cynthia Chapman RN  [facility]

## 2021-05-25 NOTE — TELEPHONE ENCOUNTER
I see refill request placed today for metoprolol.    Metoprolol, Rx for 90 days with 3 refills was sent to requested pharmacy on 10/27/20.   Should have refills available.       Patient due to be seen, see note below.    Attempted to call patient at home number, no answer, left message on voicemail; patient was instructed to return call to [facility] at [phone].    Cynthia Chapman RN  [facility]

## 2021-05-26 ENCOUNTER — TELEPHONE (OUTPATIENT)
Dept: FAMILY MEDICINE | Facility: CLINIC | Age: 86
End: 2021-05-26

## 2021-05-26 NOTE — TELEPHONE ENCOUNTER
Left message on voice mail for patient to call clinic.   [phone]  Radha Mendez RN  [facility]

## 2021-05-26 NOTE — TELEPHONE ENCOUNTER
Phones down, unable to call out, get busy signal. Will try later or tomorrow.  Radha Mendez RN  [facility]

## 2021-05-26 NOTE — TELEPHONE ENCOUNTER
Reason for call:  Other   Patient called regarding (reason for call): call back  Additional comments: Please have FABIANO Mccoy call back. Pt states they were not able to get medication from pharmacy today.    Phone number to reach patient:  Home number on file [phone] (home)    Best Time:  anytime    Can we leave a detailed message on this number?  YES    Travel screening: Not Applicable

## 2021-05-27 NOTE — TELEPHONE ENCOUNTER
"Patient calling back in saying metoprolol was not at the pharmacy.     Called pharmacy to get clarification, they have been trying to get this figured out with the patient for awhile. Duane at the pharmacy said the patient has picked up all his medications recently. 5/19/21-5/26/21 all medications were picked up.      Called patient and he said he thought there was one the previous nurse was sending in for him. Nurse advised to bring all his medications with him to his next appointment. Patient stated he will \"keep doing what I'm doing\".    Lissette Dale RN    "

## 2021-05-27 NOTE — TELEPHONE ENCOUNTER
Left message on voice mail for patient to call clinic.   [phone]  Radha Mendez RN  [facility]

## 2021-06-24 ENCOUNTER — OFFICE VISIT (OUTPATIENT)
Dept: FAMILY MEDICINE | Facility: CLINIC | Age: 86
End: 2021-06-24
Payer: COMMERCIAL

## 2021-06-24 VITALS
OXYGEN SATURATION: 95 % | TEMPERATURE: 97.1 F | DIASTOLIC BLOOD PRESSURE: 75 MMHG | HEART RATE: 65 BPM | BODY MASS INDEX: 31.59 KG/M2 | SYSTOLIC BLOOD PRESSURE: 128 MMHG | RESPIRATION RATE: 20 BRPM | WEIGHT: 217 LBS

## 2021-06-24 DIAGNOSIS — I10 HYPERTENSION GOAL BP (BLOOD PRESSURE) < 140/90: ICD-10-CM

## 2021-06-24 DIAGNOSIS — L20.89 OTHER ATOPIC DERMATITIS: ICD-10-CM

## 2021-06-24 DIAGNOSIS — I10 ESSENTIAL HYPERTENSION WITH GOAL BLOOD PRESSURE LESS THAN 140/90: ICD-10-CM

## 2021-06-24 DIAGNOSIS — Z79.4 TYPE 2 DIABETES MELLITUS WITHOUT COMPLICATION, WITH LONG-TERM CURRENT USE OF INSULIN (H): Primary | ICD-10-CM

## 2021-06-24 DIAGNOSIS — E11.9 TYPE 2 DIABETES MELLITUS WITHOUT COMPLICATION, WITH LONG-TERM CURRENT USE OF INSULIN (H): Primary | ICD-10-CM

## 2021-06-24 DIAGNOSIS — N18.31 TYPE 2 DIABETES MELLITUS WITH STAGE 3A CHRONIC KIDNEY DISEASE, WITHOUT LONG-TERM CURRENT USE OF INSULIN (H): ICD-10-CM

## 2021-06-24 DIAGNOSIS — E11.22 TYPE 2 DIABETES MELLITUS WITH STAGE 3A CHRONIC KIDNEY DISEASE, WITHOUT LONG-TERM CURRENT USE OF INSULIN (H): ICD-10-CM

## 2021-06-24 LAB — HBA1C MFR BLD: 9.4 % (ref 0–5.6)

## 2021-06-24 PROCEDURE — 99214 OFFICE O/P EST MOD 30 MIN: CPT | Performed by: PHYSICIAN ASSISTANT

## 2021-06-24 PROCEDURE — 36415 COLL VENOUS BLD VENIPUNCTURE: CPT | Performed by: PHYSICIAN ASSISTANT

## 2021-06-24 PROCEDURE — 83036 HEMOGLOBIN GLYCOSYLATED A1C: CPT | Performed by: PHYSICIAN ASSISTANT

## 2021-06-24 RX ORDER — TRIAMCINOLONE ACETONIDE 1 MG/G
CREAM TOPICAL 2 TIMES DAILY
Qty: 80 G | Refills: 0 | Status: SHIPPED | OUTPATIENT
Start: 2021-06-24

## 2021-06-24 RX ORDER — LOSARTAN POTASSIUM 25 MG/1
25 TABLET ORAL DAILY
Qty: 90 TABLET | Refills: 1 | Status: SHIPPED | OUTPATIENT
Start: 2021-06-24

## 2021-06-24 RX ORDER — GLIPIZIDE 10 MG/1
20 TABLET, FILM COATED, EXTENDED RELEASE ORAL DAILY
Qty: 180 TABLET | Refills: 0 | Status: SHIPPED | OUTPATIENT
Start: 2021-06-24 | End: 2021-09-16

## 2021-06-24 RX ORDER — AMLODIPINE BESYLATE 10 MG/1
10 TABLET ORAL DAILY
Qty: 90 TABLET | Refills: 1 | Status: SHIPPED | OUTPATIENT
Start: 2021-06-24

## 2021-06-24 NOTE — PROGRESS NOTES
Christoph Page is a 87 year old who presents for the following health issues    HPI     Diabetes Follow-up    How often are you checking your blood sugar? One time daily  What time of day are you checking your blood sugars (select all that apply)?  in the morning  Have you had any blood sugars above 200?  No  Have you had any blood sugars below 70?  No    What symptoms do you notice when your blood sugar is low?  None and Not applicable    What concerns do you have today about your diabetes? None     Do you have any of these symptoms? (Select all that apply)  No numbness or tingling in feet.  No redness, sores or blisters on feet.  No complaints of excessive thirst.  No reports of blurry vision.  No significant changes to weight.    Have you had a diabetic eye exam in the last 12 months? No    No hypoglycemia.       BP Readings from Last 2 Encounters:   06/24/21 128/75   02/22/21 138/74     Hemoglobin A1C (%)   Date Value   06/24/2021 9.4 (H)   02/22/2021 9.1 (H)     LDL Cholesterol Calculated (mg/dL)   Date Value   10/27/2020 67   07/23/2019 77         Hypertension Follow-up      Do you check your blood pressure regularly outside of the clinic? No     Are you following a low salt diet? No    Are your blood pressures ever more than 140 on the top number (systolic) OR more   than 90 on the bottom number (diastolic), for example 140/90? No - does not check at home      How many servings of fruits and vegetables do you eat daily?  0-1    On average, how many sweetened beverages do you drink each day (Examples: soda, juice, sweet tea, etc.  Do NOT count diet or artificially sweetened beverages)?   0    How many days per week do you exercise enough to make your heart beat faster? 3 or less    How many minutes a day do you exercise enough to make your heart beat faster? 10 - 19    How many days per week do you miss taking your medication? 0-1    No chest pain/sob/palpitations/dizziness/ha's  No neuropathy  symptoms .  No vision symptoms.     Review of Systems   Constitutional, HEENT, cardiovascular, pulmonary, GI, , musculoskeletal, neuro, skin, endocrine and psych systems are negative, except as otherwise noted.      Objective    /75   Pulse 65   Temp 97.1  F (36.2  C)   Resp 20   Wt 98.4 kg (217 lb)   SpO2 95%   BMI 31.59 kg/m    Body mass index is 31.59 kg/m .  Physical Exam   Eye exam - right eye normal lid, conjunctiva, cornea, pupil and fundus, left eye normal lid, conjunctiva, cornea, pupil and fundus.  Thyroid not palpable, not enlarged, no nodules detected.  CHEST:chest clear to IPPA, no tachypnea, retractions or cyanosis and S1, S2 normal, no murmur, no gallop, rate regular.  Foot exam - both sides normal; no swelling, tenderness or skin or vascular lesions. Color and temperature is normal. Sensation is intact. Peripheral pulses are palpable. Toenails are normal.     Camilo was seen today for hypertension and diabetes.    Diagnoses and all orders for this visit:    Type 2 diabetes mellitus without complication, with long-term current use of insulin (H)  -     HEMOGLOBIN A1C  -     JUST IN CASE  -     glipiZIDE (GLUCOTROL XL) 10 MG 24 hr tablet; Take 2 tablets (20 mg) by mouth daily  -     linagliptin (TRADJENTA) 5 MG TABS tablet; Take 1 tablet (5 mg) by mouth daily    Type 2 diabetes mellitus with stage 3a chronic kidney disease, without long-term current use of insulin (H)  -     metFORMIN (GLUCOPHAGE) 1000 MG tablet; Take 1 tablet (1,000 mg) by mouth 2 times daily (with meals)  -     glipiZIDE (GLUCOTROL XL) 10 MG 24 hr tablet; Take 2 tablets (20 mg) by mouth daily  -     linagliptin (TRADJENTA) 5 MG TABS tablet; Take 1 tablet (5 mg) by mouth daily    Other atopic dermatitis  -     triamcinolone (KENALOG) 0.1 % external cream; Apply topically 2 times daily    Hypertension goal BP (blood pressure) < 140/90  -     losartan (COZAAR) 25 MG tablet; Take 1 tablet (25 mg) by mouth daily    Essential  hypertension with goal blood pressure less than 140/90  -     amLODIPine (NORVASC) 10 MG tablet; Take 1 tablet (10 mg) by mouth daily    Other orders  -     REVIEW OF HEALTH MAINTENANCE PROTOCOL ORDERS    add in tradjenta.  work on lifestyle modification  Recheck in 3-4 mos

## 2021-08-23 DIAGNOSIS — J41.8 MIXED SIMPLE AND MUCOPURULENT CHRONIC BRONCHITIS (H): ICD-10-CM

## 2021-08-25 RX ORDER — BUDESONIDE AND FORMOTEROL FUMARATE DIHYDRATE 160; 4.5 UG/1; UG/1
AEROSOL RESPIRATORY (INHALATION)
Qty: 10.2 G | Refills: 1 | Status: SHIPPED | OUTPATIENT
Start: 2021-08-25

## 2021-08-31 DIAGNOSIS — J44.9 CHRONIC OBSTRUCTIVE PULMONARY DISEASE, UNSPECIFIED COPD TYPE (H): ICD-10-CM

## 2021-08-31 NOTE — TELEPHONE ENCOUNTER
Requested Prescriptions   Pending Prescriptions Disp Refills     albuterol (VENTOLIN HFA) 108 (90 Base) MCG/ACT inhaler       Sig: Inhale 2 puffs into the lungs 3 times daily       There is no refill protocol information for this order        Routing refill request to provider for review/approval because:  Drug not active on patient's medication list

## 2021-09-02 RX ORDER — ALBUTEROL SULFATE 90 UG/1
2 AEROSOL, METERED RESPIRATORY (INHALATION) 3 TIMES DAILY
Qty: 18 G | Refills: 1 | Status: SHIPPED | OUTPATIENT
Start: 2021-09-02

## 2021-09-09 DIAGNOSIS — E78.5 HYPERLIPIDEMIA LDL GOAL <70: ICD-10-CM

## 2021-09-10 RX ORDER — SIMVASTATIN 40 MG
40 TABLET ORAL AT BEDTIME
Qty: 90 TABLET | Refills: 0 | Status: SHIPPED | OUTPATIENT
Start: 2021-09-10 | End: 2021-10-13

## 2021-09-13 DIAGNOSIS — E11.22 TYPE 2 DIABETES MELLITUS WITH STAGE 3A CHRONIC KIDNEY DISEASE, WITHOUT LONG-TERM CURRENT USE OF INSULIN (H): ICD-10-CM

## 2021-09-13 DIAGNOSIS — E11.9 TYPE 2 DIABETES MELLITUS WITHOUT COMPLICATION, WITH LONG-TERM CURRENT USE OF INSULIN (H): ICD-10-CM

## 2021-09-13 DIAGNOSIS — Z79.4 TYPE 2 DIABETES MELLITUS WITHOUT COMPLICATION, WITH LONG-TERM CURRENT USE OF INSULIN (H): ICD-10-CM

## 2021-09-13 DIAGNOSIS — I10 ESSENTIAL HYPERTENSION WITH GOAL BLOOD PRESSURE LESS THAN 140/90: ICD-10-CM

## 2021-09-13 DIAGNOSIS — N18.31 TYPE 2 DIABETES MELLITUS WITH STAGE 3A CHRONIC KIDNEY DISEASE, WITHOUT LONG-TERM CURRENT USE OF INSULIN (H): ICD-10-CM

## 2021-09-13 RX ORDER — AMLODIPINE BESYLATE 10 MG/1
10 TABLET ORAL DAILY
Qty: 90 TABLET | Refills: 1 | Status: CANCELLED | OUTPATIENT
Start: 2021-09-13

## 2021-09-16 RX ORDER — GLIPIZIDE 10 MG/1
20 TABLET, FILM COATED, EXTENDED RELEASE ORAL DAILY
Qty: 60 TABLET | Refills: 0 | Status: SHIPPED | OUTPATIENT
Start: 2021-09-16 | End: 2021-12-03

## 2021-09-16 NOTE — TELEPHONE ENCOUNTER
"Prescription approved per [facility] Refill Protocol.  Requested Prescriptions   Pending Prescriptions Disp Refills     glipiZIDE (GLUCOTROL XL) 10 MG 24 hr tablet 60 tablet 0     Sig: Take 2 tablets (20 mg) by mouth daily       Sulfonylurea Agents Passed - 9/13/2021  9:32 AM        Passed - Patient has documented A1c within the specified period of time.     If HgbA1C is 8 or greater, it needs to be on file within the past 3 months.  If less than 8, must be on file within the past 6 months.     Recent Labs   Lab Test 06/24/21  0831   A1C 9.4*             Passed - Medication is active on med list        Passed - Patient is age 18 or older        Passed - Patient has a recent creatinine (normal) within the past 12 mos.     Recent Labs   Lab Test 10/27/20  1021   CR 1.15       Ok to refill medication if creatinine is low          Passed - Recent (6 mo) or future (30 days) visit within the authorizing provider's specialty     Patient had office visit in the last 6 months or has a visit in the next 30 days with authorizing provider or within the authorizing provider's specialty.  See \"Patient Info\" tab in inbasket, or \"Choose Columns\" in Meds & Orders section of the refill encounter.                 "

## 2021-11-02 ENCOUNTER — OFFICE VISIT (OUTPATIENT)
Dept: FAMILY MEDICINE | Facility: CLINIC | Age: 86
End: 2021-11-02
Payer: COMMERCIAL

## 2021-11-02 ENCOUNTER — ANCILLARY PROCEDURE (OUTPATIENT)
Dept: GENERAL RADIOLOGY | Facility: CLINIC | Age: 86
End: 2021-11-02
Attending: PHYSICIAN ASSISTANT
Payer: COMMERCIAL

## 2021-11-02 VITALS
WEIGHT: 216.6 LBS | SYSTOLIC BLOOD PRESSURE: 134 MMHG | TEMPERATURE: 97.1 F | RESPIRATION RATE: 20 BRPM | HEART RATE: 94 BPM | BODY MASS INDEX: 31.53 KG/M2 | DIASTOLIC BLOOD PRESSURE: 80 MMHG | OXYGEN SATURATION: 95 %

## 2021-11-02 DIAGNOSIS — Z13.220 SCREENING FOR HYPERLIPIDEMIA: ICD-10-CM

## 2021-11-02 DIAGNOSIS — E11.9 TYPE 2 DIABETES MELLITUS WITHOUT COMPLICATION, WITH LONG-TERM CURRENT USE OF INSULIN (H): Primary | ICD-10-CM

## 2021-11-02 DIAGNOSIS — J41.8 MIXED SIMPLE AND MUCOPURULENT CHRONIC BRONCHITIS (H): ICD-10-CM

## 2021-11-02 DIAGNOSIS — S89.92XA INJURY OF LEFT KNEE, INITIAL ENCOUNTER: ICD-10-CM

## 2021-11-02 DIAGNOSIS — Z79.4 TYPE 2 DIABETES MELLITUS WITHOUT COMPLICATION, WITH LONG-TERM CURRENT USE OF INSULIN (H): Primary | ICD-10-CM

## 2021-11-02 DIAGNOSIS — E78.5 HYPERLIPIDEMIA LDL GOAL <70: ICD-10-CM

## 2021-11-02 DIAGNOSIS — I10 ESSENTIAL HYPERTENSION WITH GOAL BLOOD PRESSURE LESS THAN 140/90: ICD-10-CM

## 2021-11-02 LAB
ANION GAP SERPL CALCULATED.3IONS-SCNC: 5 MMOL/L (ref 3–14)
BUN SERPL-MCNC: 13 MG/DL (ref 7–30)
CALCIUM SERPL-MCNC: 9 MG/DL (ref 8.5–10.1)
CHLORIDE BLD-SCNC: 103 MMOL/L (ref 94–109)
CHOLEST SERPL-MCNC: 132 MG/DL
CO2 SERPL-SCNC: 28 MMOL/L (ref 20–32)
CREAT SERPL-MCNC: 1.13 MG/DL (ref 0.66–1.25)
FASTING STATUS PATIENT QL REPORTED: YES
GFR SERPL CREATININE-BSD FRML MDRD: 58 ML/MIN/1.73M2
GLUCOSE BLD-MCNC: 215 MG/DL (ref 70–99)
HBA1C MFR BLD: 9.6 % (ref 0–5.6)
HDLC SERPL-MCNC: 46 MG/DL
HOLD SPECIMEN: NORMAL
HOLD SPECIMEN: NORMAL
LDLC SERPL CALC-MCNC: 65 MG/DL
NONHDLC SERPL-MCNC: 86 MG/DL
POTASSIUM BLD-SCNC: 4.6 MMOL/L (ref 3.4–5.3)
SODIUM SERPL-SCNC: 136 MMOL/L (ref 133–144)
TRIGL SERPL-MCNC: 103 MG/DL

## 2021-11-02 PROCEDURE — 99214 OFFICE O/P EST MOD 30 MIN: CPT | Performed by: PHYSICIAN ASSISTANT

## 2021-11-02 PROCEDURE — 80061 LIPID PANEL: CPT | Performed by: PHYSICIAN ASSISTANT

## 2021-11-02 PROCEDURE — 80048 BASIC METABOLIC PNL TOTAL CA: CPT | Performed by: PHYSICIAN ASSISTANT

## 2021-11-02 PROCEDURE — 73560 X-RAY EXAM OF KNEE 1 OR 2: CPT | Mod: LT | Performed by: RADIOLOGY

## 2021-11-02 PROCEDURE — 36415 COLL VENOUS BLD VENIPUNCTURE: CPT | Performed by: PHYSICIAN ASSISTANT

## 2021-11-02 PROCEDURE — 83036 HEMOGLOBIN GLYCOSYLATED A1C: CPT | Performed by: PHYSICIAN ASSISTANT

## 2021-11-02 PROCEDURE — 99207 PR FOOT EXAM NO CHARGE: CPT | Performed by: PHYSICIAN ASSISTANT

## 2021-11-02 ASSESSMENT — PAIN SCALES - GENERAL: PAINLEVEL: MODERATE PAIN (4)

## 2021-11-02 NOTE — PROGRESS NOTES
Christoph Page is a 87 year old who presents for the following health issues     HPI     Diabetes Follow-up    How often are you checking your blood sugar? A few times a month  What time of day are you checking your blood sugars (select all that apply)?  In the morning  Have you had any blood sugars above 200?  No  Have you had any blood sugars below 70?  No    What symptoms do you notice when your blood sugar is low?  None and Not applicable    What concerns do you have today about your diabetes? None     Do you have any of these symptoms? (Select all that apply)  No numbness or tingling in feet.  No redness, sores or blisters on feet.  No complaints of excessive thirst.  No reports of blurry vision.  No significant changes to weight.    Have you had a diabetic eye exam in the last 12 months? No        BP Readings from Last 2 Encounters:   11/02/21 134/80   06/24/21 128/75     Hemoglobin A1C (%)   Date Value   11/02/2021 9.6 (H)   06/24/2021 9.4 (H)   02/22/2021 9.1 (H)     LDL Cholesterol Calculated (mg/dL)   Date Value   10/27/2020 67   07/23/2019 77     Recheck of copd. No sob. No prominent cough.     Fell on 11/1/21 (yesterday)  - left knee pain  - left arm injury    Review of Systems   Constitutional, HEENT, cardiovascular, pulmonary, GI, , musculoskeletal, neuro, skin, endocrine and psych systems are negative, except as otherwise noted.      Objective    /80   Pulse 94   Temp 97.1  F (36.2  C) (Tympanic)   Resp 20   Wt 98.2 kg (216 lb 9.6 oz)   SpO2 95%   BMI 31.53 kg/m    Body mass index is 31.53 kg/m .  Physical Exam   Abrasion/skin tear left elbow. Elbow rom normal. Nursing staff cleaned and dressed it appropriately.  KNEE: The injured knee reveals antalgic gait, effusion, reduced range of motion, negative drawer sign, collateral ligaments intact, negative Sarah sign. X-ray is negative for fracture.  Eye exam - right eye normal lid, conjunctiva, cornea, pupil and fundus, left  eye normal lid, conjunctiva, cornea, pupil and fundus.  Thyroid not palpable, not enlarged, no nodules detected.  CHEST:chest clear to IPPA, no tachypnea, retractions or cyanosis and Heart exam detail:S1, S2 normal, frequent pvc's.  chest is clear without rales or wheezing, no pedal edema, no JVD, no hepatosplenomegaly.    Camilo was seen today for diabetes and fall.    Diagnoses and all orders for this visit:    Type 2 diabetes mellitus without complication, with long-term current use of insulin (H)  -     OPTOMETRY REFERRAL; Future  -     HEMOGLOBIN A1C; Future  -     Albumin Random Urine Quantitative with Creat Ratio; Future  -     Albumin Random Urine Quantitative with Creat Ratio  -     HEMOGLOBIN A1C  -     FOOT EXAM  -     empagliflozin (JARDIANCE) 25 MG TABS tablet; Take 1 tablet (25 mg) by mouth daily    Screening for hyperlipidemia    Hyperlipidemia LDL goal <70  -     Lipid panel reflex to direct LDL Fasting; Future  -     Lipid panel reflex to direct LDL Fasting    Essential hypertension with goal blood pressure less than 140/90  -     BASIC METABOLIC PANEL; Future  -     BASIC METABOLIC PANEL    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)    Injury of left knee, initial encounter  -     XR Knee Left 1/2 Views; Future    Other orders  -     Extra Tube; Future  -     Extra Tube      Add in jardiance. Discussed diet changes.    Recheck in 3 mos

## 2021-11-02 NOTE — PATIENT INSTRUCTIONS

## 2021-11-17 DIAGNOSIS — E11.22 TYPE 2 DIABETES MELLITUS WITH STAGE 3A CHRONIC KIDNEY DISEASE, WITHOUT LONG-TERM CURRENT USE OF INSULIN (H): ICD-10-CM

## 2021-11-17 DIAGNOSIS — N18.31 TYPE 2 DIABETES MELLITUS WITH STAGE 3A CHRONIC KIDNEY DISEASE, WITHOUT LONG-TERM CURRENT USE OF INSULIN (H): ICD-10-CM

## 2021-11-30 DIAGNOSIS — N18.31 TYPE 2 DIABETES MELLITUS WITH STAGE 3A CHRONIC KIDNEY DISEASE, WITHOUT LONG-TERM CURRENT USE OF INSULIN (H): ICD-10-CM

## 2021-11-30 DIAGNOSIS — E11.22 TYPE 2 DIABETES MELLITUS WITH STAGE 3A CHRONIC KIDNEY DISEASE, WITHOUT LONG-TERM CURRENT USE OF INSULIN (H): ICD-10-CM

## 2021-11-30 DIAGNOSIS — E11.9 TYPE 2 DIABETES MELLITUS WITHOUT COMPLICATION, WITH LONG-TERM CURRENT USE OF INSULIN (H): ICD-10-CM

## 2021-11-30 DIAGNOSIS — Z79.4 TYPE 2 DIABETES MELLITUS WITHOUT COMPLICATION, WITH LONG-TERM CURRENT USE OF INSULIN (H): ICD-10-CM

## 2021-12-03 RX ORDER — GLIPIZIDE 10 MG/1
20 TABLET, FILM COATED, EXTENDED RELEASE ORAL DAILY
Qty: 180 TABLET | Refills: 0 | Status: SHIPPED | OUTPATIENT
Start: 2021-12-03

## 2021-12-09 DIAGNOSIS — I48.91 ATRIAL FIBRILLATION, UNSPECIFIED TYPE (H): ICD-10-CM

## 2021-12-11 RX ORDER — METOPROLOL SUCCINATE 50 MG/1
50 TABLET, EXTENDED RELEASE ORAL DAILY
Qty: 90 TABLET | Refills: 0 | Status: SHIPPED | OUTPATIENT
Start: 2021-12-11 | End: 2022-04-01

## 2022-06-10 DIAGNOSIS — E11.9 DIABETES MELLITUS TYPE 2, UNCOMPLICATED (H): ICD-10-CM

## 2022-06-12 RX ORDER — BLOOD SUGAR DIAGNOSTIC
STRIP MISCELLANEOUS
Qty: 300 STRIP | Refills: 0 | Status: SHIPPED | OUTPATIENT
Start: 2022-06-12

## 2022-06-12 NOTE — TELEPHONE ENCOUNTER
"Routing refill request to provider for review/approval because:  Patient needs to be seen because:  Due for follow up    Med pended with reminder    Requested Prescriptions   Pending Prescriptions Disp Refills     blood glucose (ACCU-CHEK SMARTVIEW) test strip 300 strip 0     Sig: Use to test blood sugar 3 times daily or as directed.       Diabetic Supplies Protocol Failed - 6/10/2022  9:55 AM        Failed - Recent (6 mo) or future (30 days) visit within the authorizing provider's specialty     Patient had office visit in the last 6 months or has a visit in the next 30 days with authorizing provider.  See \"Patient Info\" tab in inbasket, or \"Choose Columns\" in Meds & Orders section of the refill encounter.            Passed - Medication is active on med list        Passed - Patient is 18 years of age or older                   "

## 2022-09-30 DIAGNOSIS — I48.91 ATRIAL FIBRILLATION, UNSPECIFIED TYPE (H): ICD-10-CM

## 2022-10-01 RX ORDER — METOPROLOL SUCCINATE 50 MG/1
50 TABLET, EXTENDED RELEASE ORAL DAILY
Qty: 45 TABLET | Refills: 0 | OUTPATIENT
Start: 2022-10-01